# Patient Record
Sex: FEMALE | Race: WHITE | HISPANIC OR LATINO
[De-identification: names, ages, dates, MRNs, and addresses within clinical notes are randomized per-mention and may not be internally consistent; named-entity substitution may affect disease eponyms.]

---

## 2017-11-27 ENCOUNTER — RECORD ABSTRACTING (OUTPATIENT)
Age: 42
End: 2017-11-27

## 2017-11-27 DIAGNOSIS — Z92.89 PERSONAL HISTORY OF OTHER MEDICAL TREATMENT: ICD-10-CM

## 2017-11-27 PROBLEM — Z00.00 ENCOUNTER FOR PREVENTIVE HEALTH EXAMINATION: Status: ACTIVE | Noted: 2017-11-27

## 2017-11-29 ENCOUNTER — APPOINTMENT (OUTPATIENT)
Dept: OBGYN | Facility: CLINIC | Age: 42
End: 2017-11-29

## 2017-12-05 ENCOUNTER — APPOINTMENT (OUTPATIENT)
Dept: OBGYN | Facility: CLINIC | Age: 42
End: 2017-12-05

## 2018-02-15 ENCOUNTER — APPOINTMENT (OUTPATIENT)
Dept: OBGYN | Facility: CLINIC | Age: 43
End: 2018-02-15
Payer: MEDICAID

## 2018-02-15 VITALS
HEART RATE: 101 BPM | DIASTOLIC BLOOD PRESSURE: 94 MMHG | WEIGHT: 146 LBS | BODY MASS INDEX: 23.46 KG/M2 | HEIGHT: 66 IN | SYSTOLIC BLOOD PRESSURE: 132 MMHG

## 2018-02-15 DIAGNOSIS — Z78.9 OTHER SPECIFIED HEALTH STATUS: ICD-10-CM

## 2018-02-15 PROCEDURE — 99213 OFFICE O/P EST LOW 20 MIN: CPT | Mod: 25

## 2018-02-15 PROCEDURE — 99396 PREV VISIT EST AGE 40-64: CPT

## 2018-02-16 LAB
C TRACH RRNA SPEC QL NAA+PROBE: NOT DETECTED
HPV HIGH+LOW RISK DNA PNL CVX: NOT DETECTED
N GONORRHOEA RRNA SPEC QL NAA+PROBE: NOT DETECTED
SOURCE TP AMPLIFICATION: NORMAL

## 2018-02-22 LAB — CYTOLOGY CVX/VAG DOC THIN PREP: NORMAL

## 2019-01-04 ENCOUNTER — APPOINTMENT (OUTPATIENT)
Dept: OBGYN | Facility: CLINIC | Age: 44
End: 2019-01-04
Payer: COMMERCIAL

## 2019-01-04 VITALS
BODY MASS INDEX: 24.27 KG/M2 | HEART RATE: 75 BPM | HEIGHT: 66 IN | WEIGHT: 151 LBS | DIASTOLIC BLOOD PRESSURE: 91 MMHG | SYSTOLIC BLOOD PRESSURE: 137 MMHG

## 2019-01-04 DIAGNOSIS — N89.8 OTHER SPECIFIED NONINFLAMMATORY DISORDERS OF VAGINA: ICD-10-CM

## 2019-01-04 PROCEDURE — 99213 OFFICE O/P EST LOW 20 MIN: CPT

## 2019-01-10 LAB — CYTOLOGY CVX/VAG DOC THIN PREP: NORMAL

## 2020-10-26 ENCOUNTER — APPOINTMENT (OUTPATIENT)
Dept: OBGYN | Facility: CLINIC | Age: 45
End: 2020-10-26
Payer: COMMERCIAL

## 2020-10-26 VITALS
HEART RATE: 84 BPM | HEIGHT: 66 IN | DIASTOLIC BLOOD PRESSURE: 110 MMHG | BODY MASS INDEX: 24.16 KG/M2 | SYSTOLIC BLOOD PRESSURE: 164 MMHG | WEIGHT: 150.31 LBS

## 2020-10-26 DIAGNOSIS — Z01.419 ENCOUNTER FOR GYNECOLOGICAL EXAMINATION (GENERAL) (ROUTINE) W/OUT ABNORMAL FINDINGS: ICD-10-CM

## 2020-10-26 DIAGNOSIS — Z12.39 ENCOUNTER FOR OTHER SCREENING FOR MALIGNANT NEOPLASM OF BREAST: ICD-10-CM

## 2020-10-26 PROCEDURE — 99396 PREV VISIT EST AGE 40-64: CPT

## 2020-10-26 PROCEDURE — 99072 ADDL SUPL MATRL&STAF TM PHE: CPT

## 2020-10-29 LAB — CYTOLOGY CVX/VAG DOC THIN PREP: NORMAL

## 2020-12-23 PROBLEM — Z01.419 ENCOUNTER FOR GYNECOLOGICAL EXAMINATION: Status: RESOLVED | Noted: 2020-10-26 | Resolved: 2020-12-23

## 2021-05-21 ENCOUNTER — OUTPATIENT (OUTPATIENT)
Dept: OUTPATIENT SERVICES | Facility: HOSPITAL | Age: 46
LOS: 1 days | Discharge: HOME | End: 2021-05-21

## 2021-05-21 VITALS
RESPIRATION RATE: 16 BRPM | HEIGHT: 66 IN | DIASTOLIC BLOOD PRESSURE: 99 MMHG | HEART RATE: 78 BPM | WEIGHT: 150.8 LBS | SYSTOLIC BLOOD PRESSURE: 150 MMHG | TEMPERATURE: 98 F | OXYGEN SATURATION: 99 %

## 2021-05-21 DIAGNOSIS — Z98.51 TUBAL LIGATION STATUS: Chronic | ICD-10-CM

## 2021-05-21 DIAGNOSIS — Z01.818 ENCOUNTER FOR OTHER PREPROCEDURAL EXAMINATION: ICD-10-CM

## 2021-05-21 DIAGNOSIS — Z01.812 ENCOUNTER FOR PREPROCEDURAL LABORATORY EXAMINATION: ICD-10-CM

## 2021-05-21 DIAGNOSIS — G56.01 CARPAL TUNNEL SYNDROME, RIGHT UPPER LIMB: ICD-10-CM

## 2021-05-21 LAB
ANION GAP SERPL CALC-SCNC: 5 MMOL/L — SIGNIFICANT CHANGE UP (ref 5–17)
BASOPHILS # BLD AUTO: 0.04 K/UL — SIGNIFICANT CHANGE UP (ref 0–0.2)
BASOPHILS NFR BLD AUTO: 0.3 % — SIGNIFICANT CHANGE UP (ref 0–2)
BUN SERPL-MCNC: 13 MG/DL — SIGNIFICANT CHANGE UP (ref 7–23)
CALCIUM SERPL-MCNC: 8.9 MG/DL — SIGNIFICANT CHANGE UP (ref 8.5–10.1)
CHLORIDE SERPL-SCNC: 103 MMOL/L — SIGNIFICANT CHANGE UP (ref 96–108)
CO2 SERPL-SCNC: 31 MMOL/L — SIGNIFICANT CHANGE UP (ref 22–31)
CREAT SERPL-MCNC: 0.67 MG/DL — SIGNIFICANT CHANGE UP (ref 0.5–1.3)
EOSINOPHIL # BLD AUTO: 0 K/UL — SIGNIFICANT CHANGE UP (ref 0–0.5)
EOSINOPHIL NFR BLD AUTO: 0 % — SIGNIFICANT CHANGE UP (ref 0–6)
GLUCOSE SERPL-MCNC: 94 MG/DL — SIGNIFICANT CHANGE UP (ref 70–99)
HCG UR QL: NEGATIVE — SIGNIFICANT CHANGE UP
HCT VFR BLD CALC: 38.2 % — SIGNIFICANT CHANGE UP (ref 34.5–45)
HGB BLD-MCNC: 12 G/DL — SIGNIFICANT CHANGE UP (ref 11.5–15.5)
IMM GRANULOCYTES NFR BLD AUTO: 1.8 % — HIGH (ref 0–1.5)
LYMPHOCYTES # BLD AUTO: 17.9 % — SIGNIFICANT CHANGE UP (ref 13–44)
LYMPHOCYTES # BLD AUTO: 2.58 K/UL — SIGNIFICANT CHANGE UP (ref 1–3.3)
MCHC RBC-ENTMCNC: 23.7 PG — LOW (ref 27–34)
MCHC RBC-ENTMCNC: 31.4 GM/DL — LOW (ref 32–36)
MCV RBC AUTO: 75.5 FL — LOW (ref 80–100)
MONOCYTES # BLD AUTO: 0.92 K/UL — HIGH (ref 0–0.9)
MONOCYTES NFR BLD AUTO: 6.4 % — SIGNIFICANT CHANGE UP (ref 2–14)
NEUTROPHILS # BLD AUTO: 10.65 K/UL — HIGH (ref 1.8–7.4)
NEUTROPHILS NFR BLD AUTO: 73.6 % — SIGNIFICANT CHANGE UP (ref 43–77)
NRBC # BLD: 0 /100 WBCS — SIGNIFICANT CHANGE UP (ref 0–0)
PLATELET # BLD AUTO: 531 K/UL — HIGH (ref 150–400)
POTASSIUM SERPL-MCNC: 3.8 MMOL/L — SIGNIFICANT CHANGE UP (ref 3.5–5.3)
POTASSIUM SERPL-SCNC: 3.8 MMOL/L — SIGNIFICANT CHANGE UP (ref 3.5–5.3)
RBC # BLD: 5.06 M/UL — SIGNIFICANT CHANGE UP (ref 3.8–5.2)
RBC # FLD: 15.3 % — HIGH (ref 10.3–14.5)
SODIUM SERPL-SCNC: 139 MMOL/L — SIGNIFICANT CHANGE UP (ref 135–145)
WBC # BLD: 14.45 K/UL — HIGH (ref 3.8–10.5)
WBC # FLD AUTO: 14.45 K/UL — HIGH (ref 3.8–10.5)

## 2021-05-21 NOTE — H&P PST ADULT - NSANTHOSAYNRD_GEN_A_CORE
No. MIR screening performed.  STOP BANG Legend: 0-2 = LOW Risk; 3-4 = INTERMEDIATE Risk; 5-8 = HIGH Risk

## 2021-05-21 NOTE — H&P PST ADULT - HISTORY OF PRESENT ILLNESS
44 yo  female c/o right carpal tunnel syndrome - schedule for right carpal tunnel release     spoke in patient's  native language     denies recent travels in the past 30 days. No fever, SOB, cough, flu like symptoms or body rash- covid screen

## 2021-05-21 NOTE — H&P PST ADULT - NSICDXPROBLEM_GEN_ALL_CORE_FT
PROBLEM DIAGNOSES  Problem: Right carpal tunnel syndrome  Assessment and Plan: scheduled for right carpla tunnel release    Problem: Preoperative examination  Assessment and Plan: Preop instructions provided including NPO status. Hibiclens wash for infection control. Patient aware to stop NSAID, OTC herbals  for 7-10 days, needs to be accoumpanied by adult upon discharge.  Patient verbalized understanding  patient instructed on having covid19 swab 3 days prior to surgery

## 2021-05-21 NOTE — H&P PST ADULT - ASSESSMENT
right  carpal tunnel  CAPRINI SCORE    AGE RELATED RISK FACTORS                                                       MOBILITY RELATED FACTORS  [ ] Age 41-60 years                                            (1 Point)                  [ ] Bed rest                                                        (1 Point)  [ ] Age: 61-74 years                                           (2 Points)                [ ] Plaster cast                                                   (2 Points)  [ ] Age= 75 years                                              (3 Points)                 [ ] Bed bound for more than 72 hours                   (2 Points)    DISEASE RELATED RISK FACTORS                                               GENDER SPECIFIC FACTORS  [ ] Edema in the lower extremities                       (1 Point)                  [ ] Pregnancy                                                     (1 Point)  [ ] Varicose veins                                               (1 Point)                  [ ] Post-partum < 6 weeks                                   (1 Point)             [ ] BMI > 25 Kg/m2                                            (1 Point)                  [ ] Hormonal therapy  or oral contraception            (1 Point)                 [ ] Sepsis (in the previous month)                        (1 Point)                  [ ] History of pregnancy complications  [ ] Pneumonia or serious lung disease                                               [ ] Unexplained or recurrent                       (1 Point)           (in the previous month)                               (1 Point)  [ ] Abnormal pulmonary function test                     (1 Point)                 SURGERY RELATED RISK FACTORS  [ ] Acute myocardial infarction                              (1 Point)                 [ ]  Section                                            (1 Point)  [ ] Congestive heart failure (in the previous month)  (1 Point)                 [ ] Minor surgery                                                 (1 Point)   [ ] Inflammatory bowel disease                             (1 Point)                 [ ] Arthroscopic surgery                                        (2 Points)  [ ] Central venous access                                    (2 Points)                [ ] General surgery lasting more than 45 minutes   (2 Points)       [ ] Stroke (in the previous month)                          (5 Points)               [ ] Elective arthroplasty                                        (5 Points)                                                                                                                                               HEMATOLOGY RELATED FACTORS                                                 TRAUMA RELATED RISK FACTORS  [ ] Prior episodes of VTE                                     (3 Points)                 [ ] Fracture of the hip, pelvis, or leg                       (5 Points)  [ ] Positive family history for VTE                         (3 Points)                 [ ] Acute spinal cord injury (in the previous month)  (5 Points)  [ ] Prothrombin 32668 A                                      (3 Points)                 [ ] Paralysis  (less than 1 month)                          (5 Points)  [ ] Factor V Leiden                                             (3 Points)                 [ ] Multiple Trauma within 1 month                         (5 Points)  [ ] Lupus anticoagulants                                     (3 Points)                                                           [ ] Anticardiolipin antibodies                                (3 Points)                                                       [ ] High homocysteine in the blood                      (3 Points)                                             [ ] Other congenital or acquired thrombophilia       (3 Points)                                                [ ] Heparin induced thrombocytopenia                  (3 Points)                                          Total Score [         4)  Caprini Score 0-2: Low risk, No VTE Prophylaxis required for most patient's, encourage ambulation  Caprini Score 3-6: At Risk, Pharmacologic VTE prophylaxis is indicated for most patients ( in the absence of a contraindication)  Caprini Score Greater than or = 7: High Risk , pharmacologic VTE is indicated for most patients ( in the absence of a contraindication)    Caprini score indicates that the patient is high risk for VTE event ( score 6 or greater). Surgical patient's in this group will benefit from both pharmacologic prophylaxis and intermittent compression devices . Surgical team will determine the balance between VTE  risk and bleeding risk and other clinical considerations

## 2021-05-26 DIAGNOSIS — Z01.818 ENCOUNTER FOR OTHER PREPROCEDURAL EXAMINATION: ICD-10-CM

## 2021-06-01 ENCOUNTER — APPOINTMENT (OUTPATIENT)
Dept: DISASTER EMERGENCY | Facility: CLINIC | Age: 46
End: 2021-06-01

## 2021-06-02 LAB — SARS-COV-2 N GENE NPH QL NAA+PROBE: NOT DETECTED

## 2021-06-03 ENCOUNTER — TRANSCRIPTION ENCOUNTER (OUTPATIENT)
Age: 46
End: 2021-06-03

## 2021-06-03 PROBLEM — Z78.9 OTHER SPECIFIED HEALTH STATUS: Chronic | Status: ACTIVE | Noted: 2021-05-21

## 2021-06-03 LAB
HCT VFR BLD CALC: 36.7 % — SIGNIFICANT CHANGE UP (ref 34.5–45)
HGB BLD-MCNC: 11.4 G/DL — LOW (ref 11.5–15.5)
MCHC RBC-ENTMCNC: 23.8 PG — LOW (ref 27–34)
MCHC RBC-ENTMCNC: 31.1 GM/DL — LOW (ref 32–36)
MCV RBC AUTO: 76.8 FL — LOW (ref 80–100)
NRBC # BLD: 0 /100 WBCS — SIGNIFICANT CHANGE UP (ref 0–0)
PLATELET # BLD AUTO: 353 K/UL — SIGNIFICANT CHANGE UP (ref 150–400)
RBC # BLD: 4.78 M/UL — SIGNIFICANT CHANGE UP (ref 3.8–5.2)
RBC # FLD: 15.5 % — HIGH (ref 10.3–14.5)
WBC # BLD: 10.63 K/UL — HIGH (ref 3.8–10.5)
WBC # FLD AUTO: 10.63 K/UL — HIGH (ref 3.8–10.5)

## 2021-06-04 ENCOUNTER — OUTPATIENT (OUTPATIENT)
Dept: OUTPATIENT SERVICES | Facility: HOSPITAL | Age: 46
LOS: 1 days | Discharge: HOME | End: 2021-06-04

## 2021-06-04 VITALS
SYSTOLIC BLOOD PRESSURE: 112 MMHG | RESPIRATION RATE: 17 BRPM | DIASTOLIC BLOOD PRESSURE: 71 MMHG | HEART RATE: 58 BPM | OXYGEN SATURATION: 100 % | TEMPERATURE: 98 F

## 2021-06-04 VITALS
TEMPERATURE: 99 F | SYSTOLIC BLOOD PRESSURE: 141 MMHG | HEART RATE: 84 BPM | RESPIRATION RATE: 18 BRPM | DIASTOLIC BLOOD PRESSURE: 88 MMHG | HEIGHT: 66 IN | WEIGHT: 149.91 LBS

## 2021-06-04 DIAGNOSIS — G56.01 CARPAL TUNNEL SYNDROME, RIGHT UPPER LIMB: ICD-10-CM

## 2021-06-04 DIAGNOSIS — Z98.51 TUBAL LIGATION STATUS: Chronic | ICD-10-CM

## 2021-06-04 RX ORDER — KETOROLAC TROMETHAMINE 30 MG/ML
30 SYRINGE (ML) INJECTION ONCE
Refills: 0 | Status: DISCONTINUED | OUTPATIENT
Start: 2021-06-04 | End: 2021-06-04

## 2021-06-04 RX ORDER — ACETAMINOPHEN 500 MG
1000 TABLET ORAL ONCE
Refills: 0 | Status: COMPLETED | OUTPATIENT
Start: 2021-06-04 | End: 2021-06-04

## 2021-06-04 RX ORDER — ONDANSETRON 8 MG/1
4 TABLET, FILM COATED ORAL ONCE
Refills: 0 | Status: COMPLETED | OUTPATIENT
Start: 2021-06-04 | End: 2021-06-04

## 2021-06-04 RX ORDER — SODIUM CHLORIDE 9 MG/ML
1000 INJECTION, SOLUTION INTRAVENOUS
Refills: 0 | Status: DISCONTINUED | OUTPATIENT
Start: 2021-06-04 | End: 2021-06-04

## 2021-06-04 RX ORDER — METOCLOPRAMIDE HCL 10 MG
10 TABLET ORAL ONCE
Refills: 0 | Status: COMPLETED | OUTPATIENT
Start: 2021-06-04 | End: 2021-06-04

## 2021-06-04 RX ADMIN — Medication 30 MILLIGRAM(S): at 10:49

## 2021-06-04 RX ADMIN — Medication 10 MILLIGRAM(S): at 12:17

## 2021-06-04 RX ADMIN — SODIUM CHLORIDE 110 MILLILITER(S): 9 INJECTION, SOLUTION INTRAVENOUS at 10:17

## 2021-06-04 RX ADMIN — ONDANSETRON 4 MILLIGRAM(S): 8 TABLET, FILM COATED ORAL at 11:03

## 2021-06-04 RX ADMIN — Medication 30 MILLIGRAM(S): at 10:34

## 2021-06-04 RX ADMIN — Medication 400 MILLIGRAM(S): at 10:34

## 2021-06-04 RX ADMIN — Medication 1000 MILLIGRAM(S): at 10:49

## 2021-06-04 NOTE — ASU DISCHARGE PLAN (ADULT/PEDIATRIC) - CARE PROVIDER_API CALL
Varsha Menchaca)  [facility]  [address] [place] [id number]  Phone: [phone]  Fax: [phone]  Follow Up Time:

## 2021-06-11 DIAGNOSIS — R73.03 PREDIABETES: ICD-10-CM

## 2021-06-11 DIAGNOSIS — G56.01 CARPAL TUNNEL SYNDROME, RIGHT UPPER LIMB: ICD-10-CM

## 2021-06-11 DIAGNOSIS — E78.5 HYPERLIPIDEMIA, UNSPECIFIED: ICD-10-CM

## 2021-06-11 DIAGNOSIS — E55.9 VITAMIN D DEFICIENCY, UNSPECIFIED: ICD-10-CM

## 2021-10-28 ENCOUNTER — APPOINTMENT (OUTPATIENT)
Dept: OBGYN | Facility: CLINIC | Age: 46
End: 2021-10-28
Payer: MEDICAID

## 2021-10-28 VITALS
BODY MASS INDEX: 24.75 KG/M2 | HEIGHT: 66 IN | WEIGHT: 154 LBS | HEART RATE: 91 BPM | SYSTOLIC BLOOD PRESSURE: 138 MMHG | DIASTOLIC BLOOD PRESSURE: 93 MMHG

## 2021-10-28 PROCEDURE — 99072 ADDL SUPL MATRL&STAF TM PHE: CPT

## 2021-10-28 PROCEDURE — 99396 PREV VISIT EST AGE 40-64: CPT

## 2021-10-28 NOTE — COUNSELING
[Nutrition/ Exercise/ Weight Management] : nutrition, exercise, weight management [Body Image] : body image [Vitamins/Supplements] : vitamins/supplements [Sunscreen] : sunscreen [Drugs/Alcohol] : drugs, alcohol [Breast Self Exam] : breast self exam [Bladder Hygiene] : bladder hygiene [Sexual Abuse] : sexual abuse [Confidentiality] : confidentiality [STD (testing, results, tx)] : STD (testing, results, tx) [Vaccines] : vaccines

## 2021-10-28 NOTE — HISTORY OF PRESENT ILLNESS
[Y] : Patient is sexually active [N] : Patient denies prior pregnancies [Menarche Age: ____] : age at menarche was [unfilled] [PapSmeardate] : 10/20

## 2021-11-02 DIAGNOSIS — R92.1 MAMMOGRAPHIC CALCIFICATION FOUND ON DIAGNOSTIC IMAGING OF BREAST: ICD-10-CM

## 2021-11-03 LAB — CYTOLOGY CVX/VAG DOC THIN PREP: NORMAL

## 2021-11-06 ENCOUNTER — OUTPATIENT (OUTPATIENT)
Dept: OUTPATIENT SERVICES | Facility: HOSPITAL | Age: 46
LOS: 1 days | End: 2021-11-06

## 2021-11-06 DIAGNOSIS — Z00.8 ENCOUNTER FOR OTHER GENERAL EXAMINATION: ICD-10-CM

## 2021-11-06 DIAGNOSIS — Z98.51 TUBAL LIGATION STATUS: Chronic | ICD-10-CM

## 2021-11-27 ENCOUNTER — APPOINTMENT (OUTPATIENT)
Dept: ULTRASOUND IMAGING | Facility: CLINIC | Age: 46
End: 2021-11-27

## 2021-11-27 ENCOUNTER — RESULT REVIEW (OUTPATIENT)
Age: 46
End: 2021-11-27

## 2021-11-27 ENCOUNTER — OUTPATIENT (OUTPATIENT)
Dept: OUTPATIENT SERVICES | Facility: HOSPITAL | Age: 46
LOS: 1 days | End: 2021-11-27
Payer: COMMERCIAL

## 2021-11-27 DIAGNOSIS — Z98.51 TUBAL LIGATION STATUS: Chronic | ICD-10-CM

## 2021-11-27 DIAGNOSIS — R10.2 PELVIC AND PERINEAL PAIN: ICD-10-CM

## 2021-11-27 PROCEDURE — 76830 TRANSVAGINAL US NON-OB: CPT

## 2021-11-27 PROCEDURE — 76856 US EXAM PELVIC COMPLETE: CPT | Mod: 26

## 2021-11-27 PROCEDURE — 76830 TRANSVAGINAL US NON-OB: CPT | Mod: 26

## 2021-11-27 PROCEDURE — 76856 US EXAM PELVIC COMPLETE: CPT

## 2022-10-11 ENCOUNTER — APPOINTMENT (OUTPATIENT)
Dept: OBGYN | Facility: CLINIC | Age: 47
End: 2022-10-11

## 2022-10-11 ENCOUNTER — TRANSCRIPTION ENCOUNTER (OUTPATIENT)
Age: 47
End: 2022-10-11

## 2022-10-11 VITALS
HEIGHT: 66 IN | SYSTOLIC BLOOD PRESSURE: 141 MMHG | WEIGHT: 155.38 LBS | DIASTOLIC BLOOD PRESSURE: 90 MMHG | BODY MASS INDEX: 24.97 KG/M2

## 2022-10-11 PROCEDURE — 99214 OFFICE O/P EST MOD 30 MIN: CPT

## 2022-10-11 NOTE — REVIEW OF SYSTEMS
[Pelvic Pain] : pelvic pain [Abn Vag Bleeding] : abnormal vaginal bleeding [Nl] : Hematologic/Lymphatic [FreeTextEntry1] : vaginal dryness

## 2022-11-02 ENCOUNTER — RESULT CHARGE (OUTPATIENT)
Age: 47
End: 2022-11-02

## 2022-11-02 ENCOUNTER — APPOINTMENT (OUTPATIENT)
Dept: OBGYN | Facility: CLINIC | Age: 47
End: 2022-11-02

## 2022-11-02 VITALS — DIASTOLIC BLOOD PRESSURE: 104 MMHG | SYSTOLIC BLOOD PRESSURE: 162 MMHG | HEIGHT: 66 IN

## 2022-11-02 LAB
HCG UR QL: NEGATIVE
QUALITY CONTROL: YES

## 2022-11-02 PROCEDURE — 58558Z: CUSTOM

## 2022-11-02 PROCEDURE — 81025 URINE PREGNANCY TEST: CPT

## 2022-11-02 NOTE — PROCEDURE
[Hysteroscopy] : Hysteroscopy [Time out performed] : Pre-procedure time out performed.  Patient's name, date of birth and procedure confirmed. [Consent Obtained] : Consent obtained [Risks] : risks [Abnormal uterine bleeding] : abnormal uterine bleeding [Benefits] : benefits [Alternatives] : alternatives [Patient] : patient [Infection] : infection [Bleeding] : bleeding [Allergic Reaction] : allergic reaction [Lidocaine___ mL] : [unfilled] ~UmL of lidocaine [flexible] : Using aseptic technique a hysteroscopy was performed using a flexible hysteroscope [Sent to Pathology] : specimen was placed in buffered formalin and sent for pathology [Antibiotics given] : antibiotics not given [Hemostasis obtained] : hemostasis obtained [Tolerated Well] : Patient tolerated the procedure well [Aftercare instructions/regstrictions given and follow-up scheduled] : Aftercare instructions/restrictions given and follow-up scheduled [de-identified] : Under sterile condition and with paracervical block the cervix was dilated and endometrial sampling obtained and sent to pathology.  Hysteroscopic evaluation shows lush endometrium with normal contour.  No polyps or myomas noted.  Patient tolerated the procedure well.  Return in 4 weeks for follow-up.  Treatment options discussed and patient prefers endometrial ablation which was done in the future.

## 2022-11-02 NOTE — PROCEDURE
[Hysteroscopy] : Hysteroscopy [Time out performed] : Pre-procedure time out performed.  Patient's name, date of birth and procedure confirmed. [Consent Obtained] : Consent obtained [Abnormal uterine bleeding] : abnormal uterine bleeding [Risks] : risks [Benefits] : benefits [Alternatives] : alternatives [Patient] : patient [Infection] : infection [Bleeding] : bleeding [Allergic Reaction] : allergic reaction [Lidocaine___ mL] : [unfilled] ~UmL of lidocaine [flexible] : Using aseptic technique a hysteroscopy was performed using a flexible hysteroscope [Sent to Pathology] : specimen was placed in buffered formalin and sent for pathology [Antibiotics given] : antibiotics not given [Hemostasis obtained] : hemostasis obtained [Tolerated Well] : Patient tolerated the procedure well [Aftercare instructions/regstrictions given and follow-up scheduled] : Aftercare instructions/restrictions given and follow-up scheduled [de-identified] : Under sterile condition and with paracervical block the cervix was dilated and endometrial sampling obtained and sent to pathology.  Hysteroscopic evaluation shows lush endometrium with normal contour.  No polyps or myomas noted.  Patient tolerated the procedure well.  Return in 4 weeks for follow-up.  Treatment options discussed and patient prefers endometrial ablation which was done in the future.

## 2022-11-07 LAB — CORE LAB BIOPSY: NORMAL

## 2022-12-02 ENCOUNTER — APPOINTMENT (OUTPATIENT)
Dept: OBGYN | Facility: CLINIC | Age: 47
End: 2022-12-02

## 2022-12-02 VITALS
BODY MASS INDEX: 24.75 KG/M2 | SYSTOLIC BLOOD PRESSURE: 135 MMHG | HEIGHT: 66 IN | WEIGHT: 154 LBS | DIASTOLIC BLOOD PRESSURE: 89 MMHG

## 2022-12-02 PROCEDURE — 99214 OFFICE O/P EST MOD 30 MIN: CPT

## 2022-12-02 RX ORDER — METHOTREXATE 25 MG/.5ML
25 INJECTION, SOLUTION SUBCUTANEOUS
Qty: 2 | Refills: 0 | Status: COMPLETED | COMMUNITY
Start: 2022-07-22

## 2022-12-02 RX ORDER — IBUPROFEN 800 MG/1
800 TABLET, FILM COATED ORAL
Qty: 21 | Refills: 0 | Status: ACTIVE | COMMUNITY
Start: 2022-12-02 | End: 1900-01-01

## 2023-01-06 ENCOUNTER — RESULT CHARGE (OUTPATIENT)
Age: 48
End: 2023-01-06

## 2023-01-06 ENCOUNTER — APPOINTMENT (OUTPATIENT)
Dept: OBGYN | Facility: CLINIC | Age: 48
End: 2023-01-06
Payer: MEDICAID

## 2023-01-06 VITALS — DIASTOLIC BLOOD PRESSURE: 97 MMHG | SYSTOLIC BLOOD PRESSURE: 143 MMHG | BODY MASS INDEX: 24.86 KG/M2 | WEIGHT: 154 LBS

## 2023-01-06 DIAGNOSIS — N92.1 EXCESSIVE AND FREQUENT MENSTRUATION WITH IRREGULAR CYCLE: ICD-10-CM

## 2023-01-06 LAB
HCG UR QL: NEGATIVE
QUALITY CONTROL: YES

## 2023-01-06 PROCEDURE — 81025 URINE PREGNANCY TEST: CPT

## 2023-01-06 PROCEDURE — 58563Z: CUSTOM

## 2023-01-06 RX ORDER — KETOROLAC TROMETHAMINE 60 MG/2ML
60 INJECTION, SOLUTION INTRAMUSCULAR
Qty: 1 | Refills: 0 | Status: COMPLETED | OUTPATIENT
Start: 2023-01-06

## 2023-01-06 RX ADMIN — KETOROLAC TROMETHAMINE 0 MG/2ML: 60 INJECTION, SOLUTION INTRAMUSCULAR at 00:00

## 2023-01-06 NOTE — PROCEDURE
[Hysteroscopy] : Hysteroscopy [Time out performed] : Pre-procedure time out performed.  Patient's name, date of birth and procedure confirmed. [Consent Obtained] : Consent obtained [Abnormal uterine bleeding] : abnormal uterine bleeding [Risks] : risks [Benefits] : benefits [Alternatives] : alternatives [Patient] : patient [Infection] : infection [Bleeding] : bleeding [Allergic Reaction] : allergic reaction [Lidocaine___ mL] : [unfilled] ~UmL of lidocaine [flexible] : Using aseptic technique a hysteroscopy was performed using a flexible hysteroscope [Antibiotics given] : antibiotics not given [Hemostasis obtained] : hemostasis obtained [Tolerated Well] : Patient tolerated the procedure well [Aftercare instructions/regstrictions given and follow-up scheduled] : Aftercare instructions/restrictions given and follow-up scheduled [de-identified] : 47-year-old patient with severe menorrhagia presents for hysteroscopic endometrial ablation.  Hysteroscopy reveals normal endometrial contour with lush features.  Endometrial apparatus, M GREYSON was primed and inserted into the endometrium, the endometrium was ablated for the prescribed 2 minutes.  Postprocedure hysteroscopy revealed the endometrial cavity to have been properly and thoroughly ablated.  Patient tolerated the procedure well.  Return in 6 weeks for follow-up.

## 2023-01-22 ENCOUNTER — INPATIENT (INPATIENT)
Facility: HOSPITAL | Age: 48
LOS: 2 days | Discharge: HOME | DRG: 759 | End: 2023-01-25
Attending: OBSTETRICS & GYNECOLOGY | Admitting: OBSTETRICS & GYNECOLOGY
Payer: COMMERCIAL

## 2023-01-22 VITALS
OXYGEN SATURATION: 100 % | TEMPERATURE: 97 F | WEIGHT: 149.91 LBS | RESPIRATION RATE: 18 BRPM | HEART RATE: 100 BPM | HEIGHT: 66 IN | DIASTOLIC BLOOD PRESSURE: 75 MMHG | SYSTOLIC BLOOD PRESSURE: 128 MMHG

## 2023-01-22 DIAGNOSIS — N70.11 CHRONIC SALPINGITIS: ICD-10-CM

## 2023-01-22 DIAGNOSIS — Z98.51 TUBAL LIGATION STATUS: Chronic | ICD-10-CM

## 2023-01-22 LAB
ALBUMIN SERPL ELPH-MCNC: 3.8 G/DL — SIGNIFICANT CHANGE UP (ref 3.3–5.2)
ALP SERPL-CCNC: 129 U/L — HIGH (ref 40–120)
ALT FLD-CCNC: 14 U/L — SIGNIFICANT CHANGE UP
ANION GAP SERPL CALC-SCNC: 11 MMOL/L — SIGNIFICANT CHANGE UP (ref 5–17)
APPEARANCE UR: ABNORMAL
AST SERPL-CCNC: 16 U/L — SIGNIFICANT CHANGE UP
BACTERIA # UR AUTO: ABNORMAL
BASOPHILS # BLD AUTO: 0.07 K/UL — SIGNIFICANT CHANGE UP (ref 0–0.2)
BASOPHILS NFR BLD AUTO: 0.3 % — SIGNIFICANT CHANGE UP (ref 0–2)
BILIRUB SERPL-MCNC: 0.4 MG/DL — SIGNIFICANT CHANGE UP (ref 0.4–2)
BILIRUB UR-MCNC: NEGATIVE — SIGNIFICANT CHANGE UP
BLD GP AB SCN SERPL QL: SIGNIFICANT CHANGE UP
BUN SERPL-MCNC: 8.3 MG/DL — SIGNIFICANT CHANGE UP (ref 8–20)
CALCIUM SERPL-MCNC: 8.5 MG/DL — SIGNIFICANT CHANGE UP (ref 8.4–10.5)
CHLORIDE SERPL-SCNC: 101 MMOL/L — SIGNIFICANT CHANGE UP (ref 96–108)
CO2 SERPL-SCNC: 22 MMOL/L — SIGNIFICANT CHANGE UP (ref 22–29)
COLOR SPEC: YELLOW — SIGNIFICANT CHANGE UP
COMMENT - URINE: SIGNIFICANT CHANGE UP
CREAT SERPL-MCNC: 0.6 MG/DL — SIGNIFICANT CHANGE UP (ref 0.5–1.3)
DIFF PNL FLD: NEGATIVE — SIGNIFICANT CHANGE UP
EGFR: 112 ML/MIN/1.73M2 — SIGNIFICANT CHANGE UP
EOSINOPHIL # BLD AUTO: 0.03 K/UL — SIGNIFICANT CHANGE UP (ref 0–0.5)
EOSINOPHIL NFR BLD AUTO: 0.1 % — SIGNIFICANT CHANGE UP (ref 0–6)
EPI CELLS # UR: SIGNIFICANT CHANGE UP
FLUAV AG NPH QL: SIGNIFICANT CHANGE UP
FLUBV AG NPH QL: SIGNIFICANT CHANGE UP
GLUCOSE SERPL-MCNC: 132 MG/DL — HIGH (ref 70–99)
GLUCOSE UR QL: NEGATIVE MG/DL — SIGNIFICANT CHANGE UP
HCG SERPL-ACNC: <4 MIU/ML — SIGNIFICANT CHANGE UP
HCT VFR BLD CALC: 37.3 % — SIGNIFICANT CHANGE UP (ref 34.5–45)
HGB BLD-MCNC: 11.9 G/DL — SIGNIFICANT CHANGE UP (ref 11.5–15.5)
IMM GRANULOCYTES NFR BLD AUTO: 1.2 % — HIGH (ref 0–0.9)
KETONES UR-MCNC: ABNORMAL
LACTATE SERPL-SCNC: 1.3 MMOL/L — SIGNIFICANT CHANGE UP (ref 0.5–2)
LEUKOCYTE ESTERASE UR-ACNC: ABNORMAL
LIDOCAIN IGE QN: 25 U/L — SIGNIFICANT CHANGE UP (ref 22–51)
LYMPHOCYTES # BLD AUTO: 1.57 K/UL — SIGNIFICANT CHANGE UP (ref 1–3.3)
LYMPHOCYTES # BLD AUTO: 7.1 % — LOW (ref 13–44)
MCHC RBC-ENTMCNC: 24.9 PG — LOW (ref 27–34)
MCHC RBC-ENTMCNC: 31.9 GM/DL — LOW (ref 32–36)
MCV RBC AUTO: 78 FL — LOW (ref 80–100)
MONOCYTES # BLD AUTO: 0.57 K/UL — SIGNIFICANT CHANGE UP (ref 0–0.9)
MONOCYTES NFR BLD AUTO: 2.6 % — SIGNIFICANT CHANGE UP (ref 2–14)
NEUTROPHILS # BLD AUTO: 19.75 K/UL — HIGH (ref 1.8–7.4)
NEUTROPHILS NFR BLD AUTO: 88.7 % — HIGH (ref 43–77)
NITRITE UR-MCNC: NEGATIVE — SIGNIFICANT CHANGE UP
PH UR: 9 — HIGH (ref 5–8)
PLATELET # BLD AUTO: 393 K/UL — SIGNIFICANT CHANGE UP (ref 150–400)
POTASSIUM SERPL-MCNC: 3.6 MMOL/L — SIGNIFICANT CHANGE UP (ref 3.5–5.3)
POTASSIUM SERPL-SCNC: 3.6 MMOL/L — SIGNIFICANT CHANGE UP (ref 3.5–5.3)
PROT SERPL-MCNC: 7.8 G/DL — SIGNIFICANT CHANGE UP (ref 6.6–8.7)
PROT UR-MCNC: 30 MG/DL
RBC # BLD: 4.78 M/UL — SIGNIFICANT CHANGE UP (ref 3.8–5.2)
RBC # FLD: 15 % — HIGH (ref 10.3–14.5)
RBC CASTS # UR COMP ASSIST: SIGNIFICANT CHANGE UP /HPF (ref 0–4)
RSV RNA NPH QL NAA+NON-PROBE: SIGNIFICANT CHANGE UP
SARS-COV-2 RNA SPEC QL NAA+PROBE: SIGNIFICANT CHANGE UP
SODIUM SERPL-SCNC: 134 MMOL/L — LOW (ref 135–145)
SP GR SPEC: 1.01 — SIGNIFICANT CHANGE UP (ref 1.01–1.02)
UROBILINOGEN FLD QL: NEGATIVE MG/DL — SIGNIFICANT CHANGE UP
WBC # BLD: 22.26 K/UL — HIGH (ref 3.8–10.5)
WBC # FLD AUTO: 22.26 K/UL — HIGH (ref 3.8–10.5)
WBC UR QL: SIGNIFICANT CHANGE UP /HPF (ref 0–5)

## 2023-01-22 PROCEDURE — G1004: CPT

## 2023-01-22 PROCEDURE — 74177 CT ABD & PELVIS W/CONTRAST: CPT | Mod: 26,ME

## 2023-01-22 PROCEDURE — 99285 EMERGENCY DEPT VISIT HI MDM: CPT

## 2023-01-22 PROCEDURE — 76856 US EXAM PELVIC COMPLETE: CPT | Mod: 26

## 2023-01-22 PROCEDURE — 76830 TRANSVAGINAL US NON-OB: CPT | Mod: 26

## 2023-01-22 RX ORDER — CEFTRIAXONE 500 MG/1
1000 INJECTION, POWDER, FOR SOLUTION INTRAMUSCULAR; INTRAVENOUS EVERY 24 HOURS
Refills: 0 | Status: COMPLETED | OUTPATIENT
Start: 2023-01-22 | End: 2023-01-25

## 2023-01-22 RX ORDER — METRONIDAZOLE 500 MG
500 TABLET ORAL EVERY 12 HOURS
Refills: 0 | Status: DISCONTINUED | OUTPATIENT
Start: 2023-01-22 | End: 2023-01-25

## 2023-01-22 RX ORDER — CEFTRIAXONE 500 MG/1
1000 INJECTION, POWDER, FOR SOLUTION INTRAMUSCULAR; INTRAVENOUS ONCE
Refills: 0 | Status: COMPLETED | OUTPATIENT
Start: 2023-01-22 | End: 2023-01-22

## 2023-01-22 RX ORDER — SODIUM CHLORIDE 9 MG/ML
1000 INJECTION INTRAMUSCULAR; INTRAVENOUS; SUBCUTANEOUS ONCE
Refills: 0 | Status: COMPLETED | OUTPATIENT
Start: 2023-01-22 | End: 2023-01-22

## 2023-01-22 RX ORDER — METRONIDAZOLE 500 MG
500 TABLET ORAL ONCE
Refills: 0 | Status: COMPLETED | OUTPATIENT
Start: 2023-01-22 | End: 2023-01-22

## 2023-01-22 RX ORDER — IBUPROFEN 200 MG
600 TABLET ORAL EVERY 6 HOURS
Refills: 0 | Status: DISCONTINUED | OUTPATIENT
Start: 2023-01-22 | End: 2023-01-25

## 2023-01-22 RX ORDER — CEFTRIAXONE 500 MG/1
1000 INJECTION, POWDER, FOR SOLUTION INTRAMUSCULAR; INTRAVENOUS ONCE
Refills: 0 | Status: DISCONTINUED | OUTPATIENT
Start: 2023-01-22 | End: 2023-01-22

## 2023-01-22 RX ORDER — ACETAMINOPHEN 500 MG
1000 TABLET ORAL ONCE
Refills: 0 | Status: COMPLETED | OUTPATIENT
Start: 2023-01-22 | End: 2023-01-22

## 2023-01-22 RX ORDER — ONDANSETRON 8 MG/1
4 TABLET, FILM COATED ORAL ONCE
Refills: 0 | Status: COMPLETED | OUTPATIENT
Start: 2023-01-22 | End: 2023-01-22

## 2023-01-22 RX ORDER — MORPHINE SULFATE 50 MG/1
4 CAPSULE, EXTENDED RELEASE ORAL ONCE
Refills: 0 | Status: DISCONTINUED | OUTPATIENT
Start: 2023-01-22 | End: 2023-01-22

## 2023-01-22 RX ORDER — SODIUM CHLORIDE 9 MG/ML
1000 INJECTION INTRAMUSCULAR; INTRAVENOUS; SUBCUTANEOUS
Refills: 0 | Status: DISCONTINUED | OUTPATIENT
Start: 2023-01-22 | End: 2023-01-25

## 2023-01-22 RX ORDER — ONDANSETRON 8 MG/1
4 TABLET, FILM COATED ORAL EVERY 6 HOURS
Refills: 0 | Status: DISCONTINUED | OUTPATIENT
Start: 2023-01-22 | End: 2023-01-25

## 2023-01-22 RX ORDER — ACETAMINOPHEN 500 MG
650 TABLET ORAL EVERY 6 HOURS
Refills: 0 | Status: DISCONTINUED | OUTPATIENT
Start: 2023-01-22 | End: 2023-01-25

## 2023-01-22 RX ORDER — CEFTRIAXONE 500 MG/1
1000 INJECTION, POWDER, FOR SOLUTION INTRAMUSCULAR; INTRAVENOUS EVERY 24 HOURS
Refills: 0 | Status: DISCONTINUED | OUTPATIENT
Start: 2023-01-22 | End: 2023-01-22

## 2023-01-22 RX ADMIN — SODIUM CHLORIDE 1000 MILLILITER(S): 9 INJECTION INTRAMUSCULAR; INTRAVENOUS; SUBCUTANEOUS at 01:56

## 2023-01-22 RX ADMIN — Medication 100 MILLIGRAM(S): at 19:33

## 2023-01-22 RX ADMIN — Medication 100 MILLIGRAM(S): at 06:45

## 2023-01-22 RX ADMIN — SODIUM CHLORIDE 125 MILLILITER(S): 9 INJECTION INTRAMUSCULAR; INTRAVENOUS; SUBCUTANEOUS at 06:45

## 2023-01-22 RX ADMIN — MORPHINE SULFATE 4 MILLIGRAM(S): 50 CAPSULE, EXTENDED RELEASE ORAL at 01:56

## 2023-01-22 RX ADMIN — Medication 400 MILLIGRAM(S): at 07:32

## 2023-01-22 RX ADMIN — Medication 1000 MILLIGRAM(S): at 08:28

## 2023-01-22 RX ADMIN — ONDANSETRON 4 MILLIGRAM(S): 8 TABLET, FILM COATED ORAL at 01:56

## 2023-01-22 RX ADMIN — Medication 100 MILLIGRAM(S): at 07:32

## 2023-01-22 RX ADMIN — Medication 650 MILLIGRAM(S): at 17:25

## 2023-01-22 RX ADMIN — CEFTRIAXONE 1000 MILLIGRAM(S): 500 INJECTION, POWDER, FOR SOLUTION INTRAMUSCULAR; INTRAVENOUS at 06:46

## 2023-01-22 RX ADMIN — Medication 100 MILLIGRAM(S): at 17:26

## 2023-01-22 RX ADMIN — ONDANSETRON 4 MILLIGRAM(S): 8 TABLET, FILM COATED ORAL at 10:55

## 2023-01-22 NOTE — ED ADULT NURSE REASSESSMENT NOTE - NS ED NURSE REASSESS COMMENT FT1
Pt a&ox4, VSS, respirations even and unlabored, denies dizziness, chest pain, or SOB. Pt given PO tylenol, abx, & IVF running, pt awaiting admission, no distress noted.

## 2023-01-22 NOTE — ED ADULT NURSE REASSESSMENT NOTE - NS ED NURSE REASSESS COMMENT FT1
Assumed care from Maura LOWERY at [id number], pt a&ox4, states her pain has improved since coming in, pt has maintenance IVF & antibiotics running, pt pending sonogram results. Pt's respirations even and unlabored, VSS, no distress noted. Pt has call bell within reach and family at the bedside.

## 2023-01-22 NOTE — H&P ADULT - NSHPLABSRESULTS_GEN_ALL_CORE
LABS:                        11.9   . )-----------( 393      ( 2023 01:50 )             37.3         134<L>  |  101  |  8.3  ----------------------------<  132<H>  3.6   |  22.0  |  0.60    Ca    8.5      2023 01:50    TPro  7.8  /  Alb  3.8  /  TBili  0.4  /  DBili  x   /  AST  16  /  ALT  14  /  AlkPhos  129<H>      Urinalysis Basic - ( 2023 01:30 )    Color: Yellow / Appearance: Slightly Turbid / S.015 / pH: x  Gluc: x / Ketone: Trace  / Bili: Negative / Urobili: Negative mg/dL   Blood: x / Protein: 30 mg/dL / Nitrite: Negative   Leuk Esterase: Trace / RBC: 0-2 /HPF / WBC 0-2 /HPF   Sq Epi: x / Non Sq Epi: Occasional / Bacteria: Few    RADIOLOGY STUDIES:  < from: CT Abdomen and Pelvis w/ IV Cont (23 @ 03:39) >  IMPRESSION:  Thick-walled enhancing tubular structure in the right adnexa inseparable   from the ovary with surrounding complex fluid, suspicious for   pyosalpinx/pelvic inflammatory disease. Question short segment enhancing   tube also noted on the left. Correlation with already ordered pelvic   ultrasound is limited.    Mild intrahepatic ductal dilatation and minimal CBD dilatation of   uncertain etiology. Follow-up MRI/MRCP is recommended.    < end of copied text >    < from: US Transvaginal (23 @ 06:33) >  IMPRESSION:    Endometrial air may be compatiblewith recent procedure versus infection.    Large complex right adnexal tubular structure with hyperemia measuring   5.5 x 4.1 x 5.9 cm with adjacent complex fluid. This may reflect   tubo-ovarian abscess or pyosalpinx. Pelvic inflammatory disease canbe   considered.    Left ovary is not visualized due to significant shadowing artifact. A   tubular structure of the left adnexa measures 3.7 x 1.7 x 1.7, possibly   additional hydrosalpinx/pyosalpinx.    Correlation with GYN evaluation and pelvic MRI is recommended. Correlate   for any signs and symptoms of superimposed infection/sepsis.    < end of copied text > LABS:                        11.9   . )-----------( 393      ( 2023 01:50 )             37.3         134<L>  |  101  |  8.3  ----------------------------<  132<H>  3.6   |  22.0  |  0.60    Ca    8.5      2023 01:50    TPro  7.8  /  Alb  3.8  /  TBili  0.4  /  DBili  x   /  AST  16  /  ALT  14  /  AlkPhos  129<H>      Urinalysis Basic - ( 2023 01:30 )    Color: Yellow / Appearance: Slightly Turbid / S.015 / pH: x  Gluc: x / Ketone: Trace  / Bili: Negative / Urobili: Negative mg/dL   Blood: x / Protein: 30 mg/dL / Nitrite: Negative   Leuk Esterase: Trace / RBC: 0-2 /HPF / WBC 0-2 /HPF   Sq Epi: x / Non Sq Epi: Occasional / Bacteria: Few    RADIOLOGY STUDIES:  < from: CT Abdomen and Pelvis w/ IV Cont (23 @ 03:39) >  IMPRESSION:  Thick-walled enhancing tubular structure in the right adnexa inseparable   from the ovary with surrounding complex fluid, suspicious for   pyosalpinx/pelvic inflammatory disease. Question short segment enhancing   tube also noted on the left. Correlation with already ordered pelvic   ultrasound is limited.    Mild intrahepatic ductal dilatation and minimal CBD dilatation of   uncertain etiology. Follow-up MRI/MRCP is recommended.    < end of copied text >    < from: US Transvaginal (23 @ 06:33) >  IMPRESSION:    Endometrial air may be compatiblewith recent procedure versus infection.    Large complex right adnexal tubular structure with hyperemia measuring   5.5 x 4.1 x 5.9 cm with adjacent complex fluid. This may reflect   tubo-ovarian abscess or pyosalpinx. Pelvic inflammatory disease can be   considered.    Left ovary is not visualized due to significant shadowing artifact. A   tubular structure of the left adnexa measures 3.7 x 1.7 x 1.7, possibly   additional hydrosalpinx/pyosalpinx.    Correlation with GYN evaluation and pelvic MRI is recommended. Correlate   for any signs and symptoms of superimposed infection/sepsis.    < end of copied text >

## 2023-01-22 NOTE — ED PROVIDER NOTE - PHYSICAL EXAMINATION
Const: AOX3 nontoxic appearing, no apparent respiratory or physical distress   HEENT: NC/AT. Moist mucous membranes.  Eyes: RAFFI. EOMI  Neck: Soft and supple. Full ROM without pain.  Cardiac: Regular rate and regular rhythm. +S1/S2. No murmurs. Peripheral pulses 2+ and symmetric. No LE edema.  Resp: Speaking in full sentences. No evidence of respiratory distress. No wheezes, rales or rhonchi. No adventitious breath sounds   Abd: Soft, non-tender, non-distended. Normal bowel sounds in all 4 quadrants. No guarding or rebound.  Back: Spine midline and non-tender. No CVAT.  Skin: No rashes, abrasions or lacerations.  Lymph: No cervical lymphadenopathy.  Neuro: Awake, alert & oriented x 3. Moves all extremities symmetrically.

## 2023-01-22 NOTE — H&P ADULT - NSHPPHYSICALEXAM_GEN_ALL_CORE
Vital Signs Last 24 Hrs  T(C): 37.4 (22 Jan 2023 06:56), Max: 37.4 (22 Jan 2023 06:56)  T(F): 99.3 (22 Jan 2023 06:56), Max: 99.3 (22 Jan 2023 06:56)  HR: 105 (22 Jan 2023 06:56) (100 - 105)  BP: 129/84 (22 Jan 2023 06:56) (128/75 - 129/84)  RR: 16 (22 Jan 2023 06:56) (16 - 18)  SpO2: 99% (22 Jan 2023 06:56) (99% - 100%)    Parameters below as of 22 Jan 2023 06:56  Patient On (Oxygen Delivery Method): room air    PHYSICAL EXAM:  Gen: pale, NAD  Neuro: A&Ox3  Resp: breathing comfortably on RA  Abdomen: mild lower pelvic tenderness to palpation  PELVIC:        EXTERNAL GENITALIA: Normal. No rashes or lesions noted.         VAGINA: healthy pink mucosa, no gross lesions, green/yellow discharge noted, no blood noted.          CERVIX: no lesions. closed, no active bleeding through os        ADNEXA: no adnexal masses or tenderness appreciated.

## 2023-01-22 NOTE — ED PROVIDER NOTE - CLINICAL SUMMARY MEDICAL DECISION MAKING FREE TEXT BOX
47 y.o female Pmh of OA and DUB S.p procedure done By Her GYN DR haro on 1/6/22 present in ER  and c.o sever abdominal pain over supra pubic area over last night and nausea that she took Ibuprofen did not helped . pt states pain goes to her upper abdomen as  well. felt nausea or vomiting     will obtain labs cbc , cmp lipase , type and screen - pain control - ivf and morphine and zofran US transvaginal; 47 y.o female Pmh of OA and DUB S.p procedure done By Her GYN DR haro on 1/6/22 present in ER  and c.o sever abdominal pain over supra pubic area over last night and nausea that she took Ibuprofen did not helped . pt states pain goes to her upper abdomen as  well. felt nausea or vomiting     will obtain labs cbc , cmp lipase , type and screen - pain control - ivf and morphine and zofran US transvaginal;    George BLANK @ 9:40-- Pt seen and evaluated by GYN. To be admitted to Dr. Asencio

## 2023-01-22 NOTE — ED PROVIDER NOTE - ATTENDING APP SHARED VISIT CONTRIBUTION OF CARE
47-year-old female -0-1-4 LMP 2022 presents with pelvic pain nausea chills and foul-smelling vaginal discharge status post hysterectomy on 2023.  Patient with CT showing pyosalpinx versus TOA/PID leukocytosis hemodynamically stable at this time treat with broad-spectrum IV antibiotics as well as GYN consult.    I personally saw the patient with the PA, and completed the key components of the history and physical exam. I then discussed the management plan with the PA.

## 2023-01-22 NOTE — ED PROVIDER NOTE - CONSTITUTIONAL [-], MLM
IV Contrast- Discharge Instructions After Your CT Scan      The IV contrast you received today will be filtered from your bloodstream by your kidneys during the next 24 hours and pass from the body in urine.  You will not be aware of this process and your urine will not change in color.  To help this process you should drink at least 4 additional glasses of water or juice today.  This reduces stress on your kidneys.    Most contrast reactions are immediate.  Should you develop symptoms of concern after discharge, contact the department at the number below.  After hours you should contact your personal physician.  If you develop breathing distress or wheezing, call 911.         no fever

## 2023-01-22 NOTE — H&P ADULT - HISTORY OF PRESENT ILLNESS
HPI:     47y   Last Menstrual Period 22   presented to the ED c/o increasing pelvic pain associated with nausea, chills, and foul smelling vaginal discharge s/p hysteroscopy on 23. Imaging was suspicious for pyosalpinx vs. TOA, so she was started on triple abx and gyn was consulted. At bedside, pt reports that her pain has been increasing for the past few days and was a 10/10 last night, which prompted her to present to the ED. She notes associated nausea, denies vomiting. Reports chills, has not checked her temperature at home. Was not taking any pain medications at home. States she is unsure if she had her menstrual period in January given the expected post-procedural bleeding. Has no other complaints.    PMHX; osteoarthritis  PSHX; bilateral salpingectomy, carpal tunnel release, hysteroscopy   POBHX; NSVDx4, SAbx1  PGYNHX: denies history of abnormal pap, STDs; +fibroid uterus  SOCIAL: denies tobacco, alcohol, illicit drug use  Allergies    No Known Allergies    MEDS:  sodium chloride 0.9%. 1000 milliLiter(s) IV Continuous <Continuous>       HPI:     47y   Last Menstrual Period 22   presented to the ED c/o increasing pelvic pain associated with nausea, chills, and foul smelling vaginal discharge s/p hysteroscopy on 23. Imaging was suspicious for pyosalpinx vs. TOA, so she was started on triple abx and gyn was consulted. At bedside, pt reports that her pain has been increasing for the past few days and was a 10/10 last night, which prompted her to present to the ED. She notes associated nausea, denies vomiting. Reports chills, has not checked her temperature at home. Was not taking any pain medications at home. States she is unsure if she had her menstrual period in January given the expected post-procedural bleeding. Has no other complaints.    PMHX; osteoarthritis  PSHX; bilateral tubal ligation, carpal tunnel release, hysteroscopy   POBHX; NSVDx4, SAbx1  PGYNHX: denies history of abnormal pap, STDs; +fibroid uterus  SOCIAL: denies tobacco, alcohol, illicit drug use  Allergies    No Known Allergies    MEDS:  sodium chloride 0.9%. 1000 milliLiter(s) IV Continuous <Continuous>

## 2023-01-22 NOTE — H&P ADULT - ASSESSMENT
47y  admitted for management of TOA.   - VSS, afebrile  - WBC 22   - received rocephin, flagyl, doxy in ED  - admit to gyn for TOA manamagement  - continue with IV antibiotics  - monitor fever curve  - consider repeat imaging/IR drainage if no improvement in signs/symptoms within 48 hours  - SCDs when in bed, start subcutaneous heparin on HD#3  - keep type and screen active  - regular diet    discussed with Dr. Luis M MD1   47y  admitted for management of TOA.   - VSS, afebrile  - WBC 22   - received rocephin, flagyl, doxy in ED  - admit to gyn for TOA manamagement  - continue with IV antibiotics  - trend WBC  - monitor fever curve  - consider repeat imaging/IR drainage if no improvement in signs/symptoms within 48 hours  - SCDs when in bed, start subcutaneous heparin on HD#3  - keep type and screen active  - regular diet    discussed with Dr. Luis M MD1   47y  admitted for management of TOA.   - VSS, afebrile  - WBC 22   - received rocephin, flagyl, doxy in ED  - admit to gyn for TOA manamagement  - continue with IV antibiotics  - trend WBC  - monitor fever curve  - follow up vaginitis panel, GCCT  - consider repeat imaging/IR drainage if no improvement in signs/symptoms within 48 hours  - SCDs when in bed, start subcutaneous heparin on HD#3  - keep type and screen active  - regular diet    discussed with Dr. Luis M MD1   47y  admitted for management of TOA.   - VSS, afebrile  - WBC 22   - received rocephin, flagyl, doxy in ED  - admit to gyn for TOA manamagement  - continue with IV antibiotics  - trend WBC  - monitor fever curve  - follow up vaginitis panel, GCCT  - consider repeat imaging/IR drainage if no improvement in signs/symptoms within 48 hours  - SCDs when in bed, start subcutaneous heparin on HD#3  - keep type and screen active  - regular diet    discussed with Dr. Luis M MD1    Attending Addendum    I agree with the documentation above by the resident physician.  In short, Kolby is a 46 y/o  who presented to the emergency room with increasing pelvic pain, nausea, chills, and foul smelling vaginal discharge.  She is status post a hysteroscopic endometrial ablation  on 23.  On examination she is mildly tender to palpation in the right and left lower quadrants of her abdomen.  As above, imaging was suspicious for a pyosalpinx vs. tubo-ovarian abscess.      She currently has a leukocytosis of 22K and is afebrile.  She has been started on triple antibiotics of ceftriaxone, doxycycline, and flagyl.  Her appendix was seen on imaging and was normal in appearance.  Blood cultures have been sent and are pending.  We will plan on close inpatient management at this time.  If her symptoms do not improve with antibiotic management she may require IR drainage or surgical exploration.  All of her questions were answered.    Jayy Asencio MD

## 2023-01-22 NOTE — ED PROVIDER NOTE - OBJECTIVE STATEMENT
47 y.o female Pmh of OA and DUB S.p procedure done By Her GYN DR haro on 1/6/22 present in ER  and c.o sever abdominal pain over supra pubic area over last night and nausea that she took Ibuprofen did not helped . pt states pain goes to her upper abdomen as  well. felt nausea or vomiting - . pt states she is erickson told by OBGYn that she will have dark blood vaginal d.c after procedure that will be goes away . admit chills . 47 y.o female Pmh of OA and DUB S.p procedure done By Her GYN DR haro on 1/6/22 present in ER  and c.o sever abdominal pain over supra pubic area over last night and nausea that she took Ibuprofen did not helped . pt states pain goes to her upper abdomen as  well. felt nausea or vomiting - . pt states she is erickson told by OBGYn that she will have dark blood vaginal d.c after procedure that will be goes away . admit chills .  Harpreet  47 y.o female Pmh of OA and DUB S.p procedure done By Her GYN DR haro on 1/6/22 present in ER  and c.o sever abdominal pain over supra pubic area over last night and nausea that she took Ibuprofen did not helped . pt states she had pain on and off after procedure. pt states pain goes to her upper abdomen as  well. felt nausea or vomiting - . pt states she is erickson told by OBGYn that she will have dark blood vaginal d.c after procedure that will be goes away . admit chills .  Harpreet  47 y.o female Pmh of OA and DUB S.p procedure done By Her GYN DR haro on 1/6/22 present in ER  and c.o sever abdominal pain over supra pubic/ pelvis area over last night and nausea that she took Ibuprofen did not helped . pt states she had pain on and off after procedure. pt states pain goes to her upper abdomen as  well. felt nausea W.o vomiting . pt states she is been told by OBGYn that she will have dark blood vaginal d.c after procedure that will be goes away and nomial . admit chills  denies any fever   Harpreet

## 2023-01-22 NOTE — ED ADULT TRIAGE NOTE - CHIEF COMPLAINT QUOTE
pt c/o of severe pain in uterus x 1 hour. Pt had a procedure done few weeks ago to "wash out her uterus"

## 2023-01-22 NOTE — ED ADULT NURSE NOTE - OBJECTIVE STATEMENT
pt is a 47y female aox4, ambulatory.  c/o pelvic pain x1 hr, recently had gyn procedure.  reports no relief with ibuprofen.  states her pain radiates to abd with n/v.  no s.s acute distress noted.   currently has vaginal bleeding which is said to be normal by her gyn

## 2023-01-22 NOTE — ED PROVIDER NOTE - PROGRESS NOTE DETAILS
pt has elevated Wbc . added Ct of the abd and pelvis Ct consistent With PID / Pyosalpinx   called GYn pending US  start the pt on antibiotic as well Ct consistent With PID / Pyosalpinx   called GYN pending US and they wants call after us result   start the pt on antibiotic as well Pt is sign out to ABHAY Davis pending US and consult again GYN

## 2023-01-22 NOTE — ED PROVIDER NOTE - NS ED ATTENDING STATEMENT MOD
This was a shared visit with the BIPIN. I reviewed and verified the documentation and independently performed the documented:

## 2023-01-23 LAB
ALBUMIN SERPL ELPH-MCNC: 2.9 G/DL — LOW (ref 3.3–5.2)
ALP SERPL-CCNC: 105 U/L — SIGNIFICANT CHANGE UP (ref 40–120)
ALT FLD-CCNC: 17 U/L — SIGNIFICANT CHANGE UP
ANION GAP SERPL CALC-SCNC: 11 MMOL/L — SIGNIFICANT CHANGE UP (ref 5–17)
AST SERPL-CCNC: 17 U/L — SIGNIFICANT CHANGE UP
BASOPHILS # BLD AUTO: 0.05 K/UL — SIGNIFICANT CHANGE UP (ref 0–0.2)
BASOPHILS NFR BLD AUTO: 0.3 % — SIGNIFICANT CHANGE UP (ref 0–2)
BILIRUB SERPL-MCNC: 0.5 MG/DL — SIGNIFICANT CHANGE UP (ref 0.4–2)
BUN SERPL-MCNC: 4.8 MG/DL — LOW (ref 8–20)
C TRACH RRNA SPEC QL NAA+PROBE: SIGNIFICANT CHANGE UP
CALCIUM SERPL-MCNC: 8 MG/DL — LOW (ref 8.4–10.5)
CANDIDA AB TITR SER: SIGNIFICANT CHANGE UP
CHLORIDE SERPL-SCNC: 106 MMOL/L — SIGNIFICANT CHANGE UP (ref 96–108)
CO2 SERPL-SCNC: 19 MMOL/L — LOW (ref 22–29)
CREAT SERPL-MCNC: 0.54 MG/DL — SIGNIFICANT CHANGE UP (ref 0.5–1.3)
EGFR: 115 ML/MIN/1.73M2 — SIGNIFICANT CHANGE UP
EOSINOPHIL # BLD AUTO: 0.09 K/UL — SIGNIFICANT CHANGE UP (ref 0–0.5)
EOSINOPHIL NFR BLD AUTO: 0.5 % — SIGNIFICANT CHANGE UP (ref 0–6)
G VAGINALIS DNA SPEC QL NAA+PROBE: DETECTED
GLUCOSE SERPL-MCNC: 102 MG/DL — HIGH (ref 70–99)
HCT VFR BLD CALC: 30.5 % — LOW (ref 34.5–45)
HGB BLD-MCNC: 9.8 G/DL — LOW (ref 11.5–15.5)
IMM GRANULOCYTES NFR BLD AUTO: 1.3 % — HIGH (ref 0–0.9)
LYMPHOCYTES # BLD AUTO: 1.55 K/UL — SIGNIFICANT CHANGE UP (ref 1–3.3)
LYMPHOCYTES # BLD AUTO: 7.9 % — LOW (ref 13–44)
MCHC RBC-ENTMCNC: 25.3 PG — LOW (ref 27–34)
MCHC RBC-ENTMCNC: 32.1 GM/DL — SIGNIFICANT CHANGE UP (ref 32–36)
MCV RBC AUTO: 78.6 FL — LOW (ref 80–100)
MONOCYTES # BLD AUTO: 1.17 K/UL — HIGH (ref 0–0.9)
MONOCYTES NFR BLD AUTO: 6 % — SIGNIFICANT CHANGE UP (ref 2–14)
N GONORRHOEA RRNA SPEC QL NAA+PROBE: SIGNIFICANT CHANGE UP
NEUTROPHILS # BLD AUTO: 16.49 K/UL — HIGH (ref 1.8–7.4)
NEUTROPHILS NFR BLD AUTO: 84 % — HIGH (ref 43–77)
PLATELET # BLD AUTO: 311 K/UL — SIGNIFICANT CHANGE UP (ref 150–400)
POTASSIUM SERPL-MCNC: 3.2 MMOL/L — LOW (ref 3.5–5.3)
POTASSIUM SERPL-SCNC: 3.2 MMOL/L — LOW (ref 3.5–5.3)
PROT SERPL-MCNC: 6.7 G/DL — SIGNIFICANT CHANGE UP (ref 6.6–8.7)
RBC # BLD: 3.88 M/UL — SIGNIFICANT CHANGE UP (ref 3.8–5.2)
RBC # FLD: 15.5 % — HIGH (ref 10.3–14.5)
SODIUM SERPL-SCNC: 136 MMOL/L — SIGNIFICANT CHANGE UP (ref 135–145)
SPECIMEN SOURCE: SIGNIFICANT CHANGE UP
T VAGINALIS SPEC QL WET PREP: SIGNIFICANT CHANGE UP
WBC # BLD: 19.6 K/UL — HIGH (ref 3.8–10.5)
WBC # FLD AUTO: 19.6 K/UL — HIGH (ref 3.8–10.5)

## 2023-01-23 PROCEDURE — 74181 MRI ABDOMEN W/O CONTRAST: CPT | Mod: 26

## 2023-01-23 RX ORDER — POTASSIUM CHLORIDE 20 MEQ
20 PACKET (EA) ORAL
Refills: 0 | Status: COMPLETED | OUTPATIENT
Start: 2023-01-23 | End: 2023-01-24

## 2023-01-23 RX ADMIN — Medication 100 MILLIGRAM(S): at 07:56

## 2023-01-23 RX ADMIN — Medication 650 MILLIGRAM(S): at 08:43

## 2023-01-23 RX ADMIN — Medication 650 MILLIGRAM(S): at 08:13

## 2023-01-23 RX ADMIN — Medication 100 MILLIGRAM(S): at 18:00

## 2023-01-23 RX ADMIN — Medication 100 MILLIGRAM(S): at 19:37

## 2023-01-23 RX ADMIN — Medication 20 MILLIEQUIVALENT(S): at 23:28

## 2023-01-23 RX ADMIN — Medication 600 MILLIGRAM(S): at 19:37

## 2023-01-23 RX ADMIN — CEFTRIAXONE 1000 MILLIGRAM(S): 500 INJECTION, POWDER, FOR SOLUTION INTRAMUSCULAR; INTRAVENOUS at 06:10

## 2023-01-23 RX ADMIN — Medication 600 MILLIGRAM(S): at 20:30

## 2023-01-23 RX ADMIN — Medication 100 MILLIGRAM(S): at 06:03

## 2023-01-23 NOTE — PROGRESS NOTE ADULT - SUBJECTIVE AND OBJECTIVE BOX
ERIC PUTON is a 46yo  now HD#2  admitted for management of suspected TOA s/p hysteroscopy     S:    Afebrile overnight. Mild tenderness to palpation in RLQ, declines pain medication at this time.   The patient is doing well, reports sharp vaginal pain with urination   Tolerating PO. Ambulating without difficulty.     O:   T(C): 37.6 (23 @ 22:30), Max: 37.8 (23 @ 17:37)  HR: 93 (23 @ 22:30) (92 - 108)  BP: 113/77 (23 @ 22:30) (102/66 - 129/84)  RR: 16 (23 @ 22:30) (16 - 18)  SpO2: 98% (23 @ 22:30) (97% - 99%)    Gen: NAD, AOx3, resting comfortably on room air   Abdomen: soft, mild tenderness to palpation in RLQ   Extrem: no calf tenderness, edema     Labs:                         11.9   22. )-----------( 393      ( 2023 01:50 )             37.3         134<L>  |  101  |  8.3  ----------------------------<  132<H>  3.6   |  22.0  |  0.60    Ca    8.5      2023 01:50    TPro  7.8  /  Alb  3.8  /  TBili  0.4  /  DBili  x   /  AST  16  /  ALT  14  /  AlkPhos  129<H>  23 @ 07:01  -  23 @ 06:21  --------------------------------------------------------  IN: 375 mL / OUT: 0 mL / NET: 375 mL                         ERIC UPTON is a 48yo  now HD#2  admitted for management of suspected TOA s/p hysteroscopy     S:    Afebrile overnight. Mild tenderness to palpation in RLQ, declines pain medication at this time.  States symptoms have improved from admission  The patient is doing well, reports sharp vaginal pain with urination   Tolerating PO. Ambulating without difficulty.     O:   T(C): 37.6 (23 @ 22:30), Max: 37.8 (23 @ 17:37)  HR: 93 (23 @ 22:30) (92 - 108)  BP: 113/77 (23 @ 22:30) (102/66 - 129/84)  RR: 16 (23 @ 22:30) (16 - 18)  SpO2: 98% (23 @ 22:30) (97% - 99%)    Gen: NAD, AOx3, resting comfortably on room air   Abdomen: soft, mild tenderness to palpation in RLQ   Extrem: no calf tenderness, edema     Labs:                         11.9   22.26 )-----------( 393      ( 2023 01:50 )             37.3         134<L>  |  101  |  8.3  ----------------------------<  132<H>  3.6   |  22.0  |  0.60    Ca    8.5      2023 01:50    TPro  7.8  /  Alb  3.8  /  TBili  0.4  /  DBili  x   /  AST  16  /  ALT  14  /  AlkPhos  129<H>  23 @ 07:01  -  23 @ 06:21  --------------------------------------------------------  IN: 375 mL / OUT: 0 mL / NET: 375 mL

## 2023-01-23 NOTE — PROGRESS NOTE ADULT - ASSESSMENT
A/P ERIC UPTON is a 48yo  now HD#2  admitted for management of suspected TOA s/p hysteroscopy     Gen: VSS, afebrile overnight   CV/Pulm: Resting comfortably on room air   GI: Patient tolerating regular diet, bowel function normal. Pending MRCP for finding of minimal CBD dilatation of uncertain etiology.  : Voiding spontaneously   Heme: AM labs pending  ID: AM labs pending pending blood and urine culture results. Pending vaginitis panel and gonorrhea/chlamydia. Plan to continue with IV Flagyl 500mg BID and IV doxycyline 100mg BID   DVT ppx: ambulation encouraged, SCDs when not ambulating  Dispo: Continue with inpatient management  A/P ERIC UPTON is a 46yo  now HD#2  admitted for management of suspected TOA s/p hysteroscopy   HD#2    Gen: VSS, afebrile overnight   CV/Pulm: Resting comfortably on room air   GI: Patient tolerating regular diet, bowel function normal. Pending MRCP for finding of minimal CBD dilatation of uncertain etiology.  : Voiding spontaneously   Heme: AM labs pending  ID: AM labs pending, pending blood and urine culture results. Pending vaginitis panel and gonorrhea/chlamydia. Plan to continue IV Rocephin, Doxy, Flagyl.  DVT ppx: ambulation encouraged, SCDs when not ambulating  Dispo: Continue with inpatient management     Addendum Reza PGY4 - patient seen at bedside with intern, agree with assessment A/P ERIC UPTON is a 48yo  now HD#2  admitted for management of suspected TOA s/p hysteroscopy   HD#2    Gen: VSS, afebrile overnight   CV/Pulm: Resting comfortably on room air   GI: Patient tolerating regular diet, bowel function normal. Pending MRCP for finding of minimal CBD dilatation of uncertain etiology.  : Voiding spontaneously   Heme: AM labs pending  ID: AM labs pending, pending blood and urine culture results. Pending vaginitis panel and gonorrhea/chlamydia. Plan to continue IV Rocephin, Doxy, Flagyl.  DVT ppx: ambulation encouraged, SCDs when not ambulating  Dispo: Continue with inpatient management     Addendum Reza PGY4 - patient seen at bedside with PGY1, agree with assessment

## 2023-01-24 LAB
ANION GAP SERPL CALC-SCNC: 11 MMOL/L — SIGNIFICANT CHANGE UP (ref 5–17)
BASOPHILS # BLD AUTO: 0.06 K/UL — SIGNIFICANT CHANGE UP (ref 0–0.2)
BASOPHILS NFR BLD AUTO: 0.4 % — SIGNIFICANT CHANGE UP (ref 0–2)
BUN SERPL-MCNC: 5.5 MG/DL — LOW (ref 8–20)
CALCIUM SERPL-MCNC: 8.2 MG/DL — LOW (ref 8.4–10.5)
CHLORIDE SERPL-SCNC: 108 MMOL/L — SIGNIFICANT CHANGE UP (ref 96–108)
CO2 SERPL-SCNC: 20 MMOL/L — LOW (ref 22–29)
CREAT SERPL-MCNC: 0.47 MG/DL — LOW (ref 0.5–1.3)
CULTURE RESULTS: SIGNIFICANT CHANGE UP
EGFR: 119 ML/MIN/1.73M2 — SIGNIFICANT CHANGE UP
EOSINOPHIL # BLD AUTO: 0.16 K/UL — SIGNIFICANT CHANGE UP (ref 0–0.5)
EOSINOPHIL NFR BLD AUTO: 1 % — SIGNIFICANT CHANGE UP (ref 0–6)
GLUCOSE SERPL-MCNC: 116 MG/DL — HIGH (ref 70–99)
HCT VFR BLD CALC: 30.7 % — LOW (ref 34.5–45)
HGB BLD-MCNC: 9.6 G/DL — LOW (ref 11.5–15.5)
IMM GRANULOCYTES NFR BLD AUTO: 1.2 % — HIGH (ref 0–0.9)
LYMPHOCYTES # BLD AUTO: 1.84 K/UL — SIGNIFICANT CHANGE UP (ref 1–3.3)
LYMPHOCYTES # BLD AUTO: 11.4 % — LOW (ref 13–44)
MCHC RBC-ENTMCNC: 24.6 PG — LOW (ref 27–34)
MCHC RBC-ENTMCNC: 31.3 GM/DL — LOW (ref 32–36)
MCV RBC AUTO: 78.7 FL — LOW (ref 80–100)
MONOCYTES # BLD AUTO: 1.08 K/UL — HIGH (ref 0–0.9)
MONOCYTES NFR BLD AUTO: 6.7 % — SIGNIFICANT CHANGE UP (ref 2–14)
NEUTROPHILS # BLD AUTO: 12.84 K/UL — HIGH (ref 1.8–7.4)
NEUTROPHILS NFR BLD AUTO: 79.3 % — HIGH (ref 43–77)
PLATELET # BLD AUTO: 348 K/UL — SIGNIFICANT CHANGE UP (ref 150–400)
POTASSIUM SERPL-MCNC: 3.6 MMOL/L — SIGNIFICANT CHANGE UP (ref 3.5–5.3)
POTASSIUM SERPL-SCNC: 3.6 MMOL/L — SIGNIFICANT CHANGE UP (ref 3.5–5.3)
RBC # BLD: 3.9 M/UL — SIGNIFICANT CHANGE UP (ref 3.8–5.2)
RBC # FLD: 15.5 % — HIGH (ref 10.3–14.5)
SODIUM SERPL-SCNC: 139 MMOL/L — SIGNIFICANT CHANGE UP (ref 135–145)
SPECIMEN SOURCE: SIGNIFICANT CHANGE UP
WBC # BLD: 16.17 K/UL — HIGH (ref 3.8–10.5)
WBC # FLD AUTO: 16.17 K/UL — HIGH (ref 3.8–10.5)

## 2023-01-24 RX ORDER — LEFLUNOMIDE 10 MG/1
20 TABLET ORAL ONCE
Refills: 0 | Status: DISCONTINUED | OUTPATIENT
Start: 2023-01-24 | End: 2023-01-24

## 2023-01-24 RX ORDER — LEFLUNOMIDE 10 MG/1
20 TABLET ORAL DAILY
Refills: 0 | Status: DISCONTINUED | OUTPATIENT
Start: 2023-01-25 | End: 2023-01-25

## 2023-01-24 RX ADMIN — CEFTRIAXONE 1000 MILLIGRAM(S): 500 INJECTION, POWDER, FOR SOLUTION INTRAMUSCULAR; INTRAVENOUS at 05:59

## 2023-01-24 RX ADMIN — Medication 650 MILLIGRAM(S): at 20:44

## 2023-01-24 RX ADMIN — Medication 600 MILLIGRAM(S): at 18:39

## 2023-01-24 RX ADMIN — Medication 20 MILLIEQUIVALENT(S): at 03:06

## 2023-01-24 RX ADMIN — Medication 650 MILLIGRAM(S): at 21:49

## 2023-01-24 RX ADMIN — Medication 650 MILLIGRAM(S): at 05:59

## 2023-01-24 RX ADMIN — Medication 100 MILLIGRAM(S): at 07:27

## 2023-01-24 RX ADMIN — Medication 650 MILLIGRAM(S): at 07:02

## 2023-01-24 RX ADMIN — Medication 100 MILLIGRAM(S): at 18:39

## 2023-01-24 RX ADMIN — Medication 100 MILLIGRAM(S): at 20:43

## 2023-01-24 RX ADMIN — Medication 600 MILLIGRAM(S): at 19:30

## 2023-01-24 RX ADMIN — Medication 100 MILLIGRAM(S): at 05:59

## 2023-01-24 NOTE — PROGRESS NOTE ADULT - ASSESSMENT
A/P ERIC UPTON is a 48yo  now HD#3  admitted for management of suspected TOA s/p hysteroscopy       Gen: VSS, afebrile overnight   CV/Pulm: Resting comfortably on room air   GI: Patient tolerating regular diet, bowel function normal. Pending MRCP results for finding of minimal CBD dilatation of uncertain etiology.  : Voiding spontaneously   Heme: AM labs pending  ID: AM labs pending, pending blood and urine culture results. No growth to date on preliminary results  Negative for gonorrhea/chlamydia. Affirm positive for Gardnerella  Plan to continue IV Rocephin, Doxy, Flagyl.  DVT ppx: ambulation encouraged, SCDs when not ambulating  Dispo: Continue with inpatient management  A/P ERIC UPTON is a 46yo  now HD#3  admitted for management of suspected TOA s/p hysteroscopy       Gen: VSS, afebrile overnight   CV/Pulm: Resting comfortably on room air   GI: Patient tolerating regular diet, bowel function normal. Pending MRCP results for finding of minimal CBD dilatation of uncertain etiology.  : Voiding spontaneously   Heme: AM labs pending  ID: AM labs pending, pending blood and urine culture results. No growth to date on preliminary results  Negative for gonorrhea/chlamydia. Affirm positive for Gardnerella  Plan to continue IV Rocephin, Doxy, Flagyl.  DVT ppx: ambulation encouraged, SCDs when not ambulating  Dispo: Continue with inpatient management. Discussed with Dr. Gonzalez, who would like continued inpatient mgmt until cultures all resulted and leukocytosis resolves    Addendum Reza PGY4:  - Pt seen at bedside with PGY1. Agree with plan.   - MRCP with mildly dilated CBD to 10mm without stricture or mass. Will consider GI consult.   - Continued leukocytosis however improving and afebrile, will continue with AM labs  - Continue abx IV

## 2023-01-24 NOTE — PROGRESS NOTE ADULT - SUBJECTIVE AND OBJECTIVE BOX
ERIC UPTON is a 48yo  now HD#3  admitted for management of suspected TOA s/p hysteroscopy     S:    Afebrile overnight. Patient states that she is feeling much better. Tenderness on abdomen as also improved.   Received PO K+ overnight for K+ repletion   The patient is doing well, no complaints overnight   Tolerating PO. Ambulating without difficulty.     O:   Vital Signs Last 24 Hrs  T(C): 36.6 (2023 06:11), Max: 37.3 (2023 08:00)  T(F): 97.8 (2023 06:11), Max: 99.2 (2023 08:00)  HR: 80 (2023 06:11) (80 - 93)  BP: 110/75 (2023 06:11) (103/68 - 126/80)  RR: 17 (2023 06:11) (16 - 18)  SpO2: 98% (2023 06:11) (97% - 99%)    Gen: NAD, AOx3, resting comfortably on room air   Abdomen: soft, mild tenderness to palpation in RLQ   Extrem: no calf tenderness, edema    Labs:                         9.8    19.60 )-----------( 311      ( 2023 06:18 )             30.5         136  |  106  |  4.8<L>  ----------------------------<  102<H>  3.2<L>   |  19.0<L>  |  0.54    Ca    8.0<L>      2023 06:18    TPro  6.7  /  Alb  2.9<L>  /  TBili  0.5  /  DBili  x   /  AST  17  /  ALT  17  /  AlkPhos  105  23 @ 07:01  -  23 @ 07:00  --------------------------------------------------------  IN: 375 mL / OUT: 0 mL / NET: 375 mL    23 @ 07:01  -  01-24-23 @ 06:28  --------------------------------------------------------  IN: 100 mL / OUT: 0 mL / NET: 100 mL

## 2023-01-24 NOTE — CHART NOTE - NSCHARTNOTEFT_GEN_A_CORE
Patient called after nurse made team aware that she had questions about test results. Conversation had in Italian, which is patient's preferred language.   Patient was explained results of MRCP. Plan for possible GI consult inpatient vs outpatient; to be decided by GYN team in AM.   All questions answered to patient's satisfaction.   Patient requests  for all conversations moving forward. Patient called after nurse made team aware that she had questions about test results. Conversation had in [place], which is patient's preferred language.   Patient was explained results of MRCP. Plan for possible GI consult inpatient vs outpatient; to be decided by GYN team in AM.   All questions answered to patient's satisfaction.   Patient requests  for all conversations moving forward.  Home medication (leflunomide) ordered. Weekly injection will hold for now. Not in house, partner will bring in tomorrow.

## 2023-01-25 ENCOUNTER — TRANSCRIPTION ENCOUNTER (OUTPATIENT)
Age: 48
End: 2023-01-25

## 2023-01-25 VITALS
HEART RATE: 92 BPM | DIASTOLIC BLOOD PRESSURE: 86 MMHG | RESPIRATION RATE: 16 BRPM | TEMPERATURE: 98 F | SYSTOLIC BLOOD PRESSURE: 129 MMHG | OXYGEN SATURATION: 98 %

## 2023-01-25 LAB
ANION GAP SERPL CALC-SCNC: 11 MMOL/L — SIGNIFICANT CHANGE UP (ref 5–17)
BASOPHILS # BLD AUTO: 0.07 K/UL — SIGNIFICANT CHANGE UP (ref 0–0.2)
BASOPHILS NFR BLD AUTO: 0.5 % — SIGNIFICANT CHANGE UP (ref 0–2)
BUN SERPL-MCNC: 7.3 MG/DL — LOW (ref 8–20)
CALCIUM SERPL-MCNC: 8.6 MG/DL — SIGNIFICANT CHANGE UP (ref 8.4–10.5)
CHLORIDE SERPL-SCNC: 106 MMOL/L — SIGNIFICANT CHANGE UP (ref 96–108)
CO2 SERPL-SCNC: 23 MMOL/L — SIGNIFICANT CHANGE UP (ref 22–29)
CREAT SERPL-MCNC: 0.45 MG/DL — LOW (ref 0.5–1.3)
EGFR: 120 ML/MIN/1.73M2 — SIGNIFICANT CHANGE UP
EOSINOPHIL # BLD AUTO: 0.13 K/UL — SIGNIFICANT CHANGE UP (ref 0–0.5)
EOSINOPHIL NFR BLD AUTO: 1 % — SIGNIFICANT CHANGE UP (ref 0–6)
GLUCOSE SERPL-MCNC: 98 MG/DL — SIGNIFICANT CHANGE UP (ref 70–99)
HCT VFR BLD CALC: 31.9 % — LOW (ref 34.5–45)
HGB BLD-MCNC: 10.3 G/DL — LOW (ref 11.5–15.5)
IMM GRANULOCYTES NFR BLD AUTO: 1.4 % — HIGH (ref 0–0.9)
LYMPHOCYTES # BLD AUTO: 15.6 % — SIGNIFICANT CHANGE UP (ref 13–44)
LYMPHOCYTES # BLD AUTO: 2.01 K/UL — SIGNIFICANT CHANGE UP (ref 1–3.3)
MCHC RBC-ENTMCNC: 25.1 PG — LOW (ref 27–34)
MCHC RBC-ENTMCNC: 32.3 GM/DL — SIGNIFICANT CHANGE UP (ref 32–36)
MCV RBC AUTO: 77.8 FL — LOW (ref 80–100)
MONOCYTES # BLD AUTO: 1.05 K/UL — HIGH (ref 0–0.9)
MONOCYTES NFR BLD AUTO: 8.1 % — SIGNIFICANT CHANGE UP (ref 2–14)
NEUTROPHILS # BLD AUTO: 9.46 K/UL — HIGH (ref 1.8–7.4)
NEUTROPHILS NFR BLD AUTO: 73.4 % — SIGNIFICANT CHANGE UP (ref 43–77)
PLATELET # BLD AUTO: 348 K/UL — SIGNIFICANT CHANGE UP (ref 150–400)
POTASSIUM SERPL-MCNC: 3.7 MMOL/L — SIGNIFICANT CHANGE UP (ref 3.5–5.3)
POTASSIUM SERPL-SCNC: 3.7 MMOL/L — SIGNIFICANT CHANGE UP (ref 3.5–5.3)
RBC # BLD: 4.1 M/UL — SIGNIFICANT CHANGE UP (ref 3.8–5.2)
RBC # FLD: 15.6 % — HIGH (ref 10.3–14.5)
SODIUM SERPL-SCNC: 140 MMOL/L — SIGNIFICANT CHANGE UP (ref 135–145)
WBC # BLD: 12.9 K/UL — HIGH (ref 3.8–10.5)
WBC # FLD AUTO: 12.9 K/UL — HIGH (ref 3.8–10.5)

## 2023-01-25 PROCEDURE — 87637 SARSCOV2&INF A&B&RSV AMP PRB: CPT

## 2023-01-25 PROCEDURE — 86900 BLOOD TYPING SEROLOGIC ABO: CPT

## 2023-01-25 PROCEDURE — 87491 CHLMYD TRACH DNA AMP PROBE: CPT

## 2023-01-25 PROCEDURE — 87591 N.GONORRHOEAE DNA AMP PROB: CPT

## 2023-01-25 PROCEDURE — 87086 URINE CULTURE/COLONY COUNT: CPT

## 2023-01-25 PROCEDURE — 86901 BLOOD TYPING SEROLOGIC RH(D): CPT

## 2023-01-25 PROCEDURE — 80048 BASIC METABOLIC PNL TOTAL CA: CPT

## 2023-01-25 PROCEDURE — 96375 TX/PRO/DX INJ NEW DRUG ADDON: CPT

## 2023-01-25 PROCEDURE — 36415 COLL VENOUS BLD VENIPUNCTURE: CPT

## 2023-01-25 PROCEDURE — 81001 URINALYSIS AUTO W/SCOPE: CPT

## 2023-01-25 PROCEDURE — 74177 CT ABD & PELVIS W/CONTRAST: CPT | Mod: ME

## 2023-01-25 PROCEDURE — 85025 COMPLETE CBC W/AUTO DIFF WBC: CPT

## 2023-01-25 PROCEDURE — 83605 ASSAY OF LACTIC ACID: CPT

## 2023-01-25 PROCEDURE — 99285 EMERGENCY DEPT VISIT HI MDM: CPT

## 2023-01-25 PROCEDURE — 87040 BLOOD CULTURE FOR BACTERIA: CPT

## 2023-01-25 PROCEDURE — 86850 RBC ANTIBODY SCREEN: CPT

## 2023-01-25 PROCEDURE — 76830 TRANSVAGINAL US NON-OB: CPT

## 2023-01-25 PROCEDURE — 80053 COMPREHEN METABOLIC PANEL: CPT

## 2023-01-25 PROCEDURE — G1004: CPT

## 2023-01-25 PROCEDURE — 84702 CHORIONIC GONADOTROPIN TEST: CPT

## 2023-01-25 PROCEDURE — 74181 MRI ABDOMEN W/O CONTRAST: CPT

## 2023-01-25 PROCEDURE — 87800 DETECT AGNT MULT DNA DIREC: CPT

## 2023-01-25 PROCEDURE — 83690 ASSAY OF LIPASE: CPT

## 2023-01-25 PROCEDURE — 96374 THER/PROPH/DIAG INJ IV PUSH: CPT

## 2023-01-25 PROCEDURE — 76856 US EXAM PELVIC COMPLETE: CPT

## 2023-01-25 RX ORDER — METRONIDAZOLE 500 MG
2 TABLET ORAL
Qty: 56 | Refills: 0
Start: 2023-01-25 | End: 2023-02-07

## 2023-01-25 RX ORDER — LEFLUNOMIDE 10 MG/1
1 TABLET ORAL
Qty: 0 | Refills: 0 | DISCHARGE
Start: 2023-01-25

## 2023-01-25 RX ADMIN — CEFTRIAXONE 1000 MILLIGRAM(S): 500 INJECTION, POWDER, FOR SOLUTION INTRAMUSCULAR; INTRAVENOUS at 06:28

## 2023-01-25 RX ADMIN — Medication 100 MILLIGRAM(S): at 06:29

## 2023-01-25 NOTE — PROGRESS NOTE ADULT - ASSESSMENT
A/P ERIC UPTON is a 48yo  now HD#4  admitted for management of suspected TOA s/p hysteroscopy       Gen: VSS, afebrile overnight   CV/Pulm: Resting comfortably on room air   GI: Patient tolerating regular diet, bowel function normal.  MRCP with mildly dilated CBD to 10mm without stricture or mass. Consider outpatient GI work up.   : Voiding spontaneously   Heme: 9.8>9.6>10.3  ID: WBC: 22.2>19.6>16.17 >12.9   Pending final blood culture results. No growth to date on preliminary results  Urine Culture: positive for group B Step.   Negative for gonorrhea/chlamydia. Affirm positive for Gardnerella  Plan to discontinue IV Rocephin, Doxy, Flagyl and send PO doxycycline and flagyl to patient's pharmacy.  DVT ppx: ambulation encouraged, SCDs when not ambulating  Dispo: Discussed with Dr. Gonzalez, plan to transition to PO antibiotics and discharge home today with close follow up in the GYN office in 1 week.

## 2023-01-25 NOTE — PROGRESS NOTE ADULT - SUBJECTIVE AND OBJECTIVE BOX
ERIC UPTON is a 46yo  now HD#4  admitted for management of suspected TOA s/p hysteroscopy     S:    Afebrile overnight. Patient states that she is feeling "much better". Tenderness on abdomen has improved.   The patient is doing well, no complaints overnight   Tolerating PO. Ambulating without difficulty.     O:   Vital Signs Last 24 Hrs  T(C): 36.9 (2023 19:40), Max: 37.1 (2023 16:04)  T(F): 98.4 (2023 19:40), Max: 98.7 (2023 16:04)  HR: 94 (2023 19:40) (77 - 94)  BP: 114/76 (2023 19:40) (114/76 - 121/81)  RR: 16 (2023 19:40) (16 - 16)  SpO2: 98% (2023 19:40) (98% - 99%)      Gen: NAD, AOx3, resting comfortably on room air   Abdomen: soft, mild tenderness to palpation in RLQ   Extrem: no calf tenderness, edema      Labs:                         9.6    16.17 )-----------( 348      ( 2023 06:35 )             30.7         139  |  108  |  5.5<L>  ----------------------------<  116<H>  3.6   |  20.0<L>  |  0.47<L>    Ca    8.2<L>      2023 06:35        23 @ 07:01  -  23 @ 07:00  --------------------------------------------------------  IN: 100 mL / OUT: 0 mL / NET: 100 mL

## 2023-01-25 NOTE — DISCHARGE NOTE PROVIDER - NSDCFUSCHEDAPPT_GEN_ALL_CORE_FT
Abdullahi Gonzalez  [facility]  [facility] [address]  Scheduled Appointment: 02/17/2023

## 2023-01-25 NOTE — DISCHARGE NOTE PROVIDER - NSDCMRMEDTOKEN_GEN_ALL_CORE_FT
doxycycline hyclate 100 mg oral tablet: 1 tab(s) orally 2 times a day   leflunomide 20 mg oral tablet: 1 tab(s) orally once a day  metroNIDAZOLE 250 mg oral tablet: 2 tab(s) orally 2 times a day

## 2023-01-25 NOTE — DISCHARGE NOTE PROVIDER - HOSPITAL COURSE
Patient admitted for pelvic pain associated with foul smelling vaginal discharge, nausea, and chills following D&C hysteroscopy 3 weeks prior. Lab findings showed bacterial vaginosis and GBS bacteruria and imaging findings were consistent with likely diagnosis of tuboovarian abscess. Patient was treated with IV rocephin, doxycycline, and metronidazole, to which her symptoms responded appropriately. On discharge she reports resolution of discharge and improvement in pelvic pain, leukocytosis had decreased appropriately, and patient was persistently afebrile. During her stay incidental finding of CBD dilation with no evidence of choledocolithiasis was noted; patient to follow up outpatient with GI for continued management.

## 2023-01-25 NOTE — DISCHARGE NOTE PROVIDER - CARE PROVIDER_API CALL
Gilma Robles)  Gastroenterology  [address], [place] [id number]  Phone: [phone]  Fax: [phone]  Follow Up Time: Routine    Abdullahi Gonzalez)  Obstetrics and Gynecology  [address] [facility], [address]  [place] [address]  Phone: [phone]  Fax: [phone]  Follow Up Time: 1 week

## 2023-01-25 NOTE — DISCHARGE NOTE PROVIDER - PROVIDER TOKENS
PROVIDER:[TOKEN:[[id number]],FOLLOWUP:[Routine]],PROVIDER:[TOKEN:[[id number]],FOLLOWUP:[1 week]]

## 2023-01-25 NOTE — DISCHARGE NOTE NURSING/CASE MANAGEMENT/SOCIAL WORK - PATIENT PORTAL LINK FT
You can access the FollowMyHealth Patient Portal offered by [facility] by registering at the following website: [email or web address]. By joining [facility]’s FollowMyHealth portal, you will also be able to view your health information using other applications (apps) compatible with our system.

## 2023-01-27 LAB
CULTURE RESULTS: SIGNIFICANT CHANGE UP
CULTURE RESULTS: SIGNIFICANT CHANGE UP
SPECIMEN SOURCE: SIGNIFICANT CHANGE UP
SPECIMEN SOURCE: SIGNIFICANT CHANGE UP

## 2023-02-01 ENCOUNTER — APPOINTMENT (OUTPATIENT)
Dept: OBGYN | Facility: CLINIC | Age: 48
End: 2023-02-01
Payer: MEDICAID

## 2023-02-01 VITALS
WEIGHT: 151.6 LBS | DIASTOLIC BLOOD PRESSURE: 84 MMHG | HEIGHT: 66 IN | BODY MASS INDEX: 24.36 KG/M2 | SYSTOLIC BLOOD PRESSURE: 120 MMHG

## 2023-02-01 PROCEDURE — 99396 PREV VISIT EST AGE 40-64: CPT

## 2023-02-01 PROCEDURE — 99215 OFFICE O/P EST HI 40 MIN: CPT | Mod: 25

## 2023-02-01 NOTE — COUNSELING
[Nutrition/ Exercise/ Weight Management] : nutrition, exercise, weight management [Body Image] : body image [Vitamins/Supplements] : vitamins/supplements [Sunscreen] : sunscreen [Drugs/Alcohol] : drugs, alcohol [Breast Self Exam] : breast self exam [Bladder Hygiene] : bladder hygiene [Confidentiality] : confidentiality [Vaccines] : vaccines

## 2023-02-01 NOTE — HISTORY OF PRESENT ILLNESS
[Menarche Age: ____] : age at menarche was [unfilled] [Y] : Patient uses contraception [PGHxTotal] : 4 [[facility]] : 4 [PGHxPremature] : 0 [PGHxAbortions] : 0 [[facility]Living] : 4 [PGHxABInduced] : 0 [PGHxEctopic] : 0 [PGHxABSpont] : 0 [PGHxMultBirths] : 0

## 2023-02-01 NOTE — PHYSICAL EXAM
[Chaperone Present] : A chaperone was present in the examining room during all aspects of the physical examination [FreeTextEntry1] : Chata [Appropriately responsive] : appropriately responsive [Alert] : alert [No Acute Distress] : no acute distress [No Lymphadenopathy] : no lymphadenopathy [Regular Rate Rhythm] : regular rate rhythm [No Murmurs] : no murmurs [Clear to Auscultation B/L] : clear to auscultation bilaterally [Soft] : soft [Non-tender] : non-tender [Non-distended] : non-distended [No HSM] : No HSM [No Lesions] : no lesions [No Mass] : no mass [Oriented x3] : oriented x3 [Examination Of The Breasts] : a normal appearance [No Masses] : no breast masses were palpable [Labia Majora] : normal [Labia Minora] : normal [Normal] : normal [Uterine Adnexae] : normal

## 2023-02-04 LAB — CYTOLOGY CVX/VAG DOC THIN PREP: NORMAL

## 2023-03-06 ENCOUNTER — APPOINTMENT (OUTPATIENT)
Dept: OBGYN | Facility: CLINIC | Age: 48
End: 2023-03-06
Payer: MEDICAID

## 2023-03-06 VITALS
DIASTOLIC BLOOD PRESSURE: 82 MMHG | SYSTOLIC BLOOD PRESSURE: 132 MMHG | BODY MASS INDEX: 24.11 KG/M2 | HEIGHT: 66 IN | WEIGHT: 150 LBS

## 2023-03-06 DIAGNOSIS — N70.93 SALPINGITIS AND OOPHORITIS, UNSPECIFIED: ICD-10-CM

## 2023-03-06 PROCEDURE — 99213 OFFICE O/P EST LOW 20 MIN: CPT

## 2023-03-14 ENCOUNTER — OUTPATIENT (OUTPATIENT)
Dept: OUTPATIENT SERVICES | Facility: HOSPITAL | Age: 48
LOS: 1 days | End: 2023-03-14

## 2023-03-14 ENCOUNTER — APPOINTMENT (OUTPATIENT)
Dept: ULTRASOUND IMAGING | Facility: CLINIC | Age: 48
End: 2023-03-14
Payer: MEDICAID

## 2023-03-14 DIAGNOSIS — Z98.51 TUBAL LIGATION STATUS: Chronic | ICD-10-CM

## 2023-03-14 DIAGNOSIS — N70.93 SALPINGITIS AND OOPHORITIS, UNSPECIFIED: ICD-10-CM

## 2023-03-14 PROCEDURE — 76856 US EXAM PELVIC COMPLETE: CPT | Mod: 26

## 2023-04-18 ENCOUNTER — APPOINTMENT (OUTPATIENT)
Dept: OBGYN | Facility: CLINIC | Age: 48
End: 2023-04-18
Payer: MEDICAID

## 2023-04-18 VITALS
DIASTOLIC BLOOD PRESSURE: 90 MMHG | BODY MASS INDEX: 23.95 KG/M2 | WEIGHT: 149 LBS | SYSTOLIC BLOOD PRESSURE: 157 MMHG | HEIGHT: 66 IN

## 2023-04-18 DIAGNOSIS — D25.9 LEIOMYOMA OF UTERUS, UNSPECIFIED: ICD-10-CM

## 2023-04-18 PROCEDURE — 99214 OFFICE O/P EST MOD 30 MIN: CPT

## 2023-12-11 ENCOUNTER — APPOINTMENT (OUTPATIENT)
Dept: OBGYN | Facility: CLINIC | Age: 48
End: 2023-12-11
Payer: MEDICAID

## 2023-12-11 VITALS
DIASTOLIC BLOOD PRESSURE: 80 MMHG | SYSTOLIC BLOOD PRESSURE: 130 MMHG | WEIGHT: 149 LBS | HEIGHT: 66 IN | BODY MASS INDEX: 23.95 KG/M2

## 2023-12-11 DIAGNOSIS — R10.2 PELVIC AND PERINEAL PAIN: ICD-10-CM

## 2023-12-11 DIAGNOSIS — Z87.39 PERSONAL HISTORY OF OTHER DISEASES OF THE MUSCULOSKELETAL SYSTEM AND CONNECTIVE TISSUE: ICD-10-CM

## 2023-12-11 PROCEDURE — 99213 OFFICE O/P EST LOW 20 MIN: CPT

## 2023-12-11 RX ORDER — DIAZEPAM 5 MG/1
5 TABLET ORAL
Qty: 1 | Refills: 0 | Status: DISCONTINUED | COMMUNITY
Start: 2022-12-02 | End: 2023-12-11

## 2023-12-11 RX ORDER — MEDROXYPROGESTERONE ACETATE 10 MG/1
10 TABLET ORAL
Qty: 10 | Refills: 2 | Status: DISCONTINUED | COMMUNITY
Start: 2022-12-02 | End: 2023-12-11

## 2023-12-11 RX ORDER — MISOPROSTOL 200 UG/1
200 TABLET ORAL
Qty: 2 | Refills: 0 | Status: DISCONTINUED | COMMUNITY
Start: 2022-12-02 | End: 2023-12-11

## 2024-02-06 NOTE — PATIENT PROFILE ADULT - ARRIVAL FROM
What Type Of Note Output Would You Prefer (Optional)?: Standard Output
How Severe Are Your Spot(S)?: mild
Hpi Title: Evaluation of Skin Lesions
Additional History: Patient states she has a freckle on her right wrist that is now inflamed and raised.
Home

## 2024-08-27 DIAGNOSIS — Z01.419 ENCOUNTER FOR GYNECOLOGICAL EXAMINATION (GENERAL) (ROUTINE) W/OUT ABNORMAL FINDINGS: ICD-10-CM

## 2024-08-28 ENCOUNTER — NON-APPOINTMENT (OUTPATIENT)
Age: 49
End: 2024-08-28

## 2024-08-29 ENCOUNTER — APPOINTMENT (OUTPATIENT)
Dept: OBGYN | Facility: CLINIC | Age: 49
End: 2024-08-29
Payer: COMMERCIAL

## 2024-08-29 ENCOUNTER — RESULT REVIEW (OUTPATIENT)
Age: 49
End: 2024-08-29

## 2024-08-29 VITALS
BODY MASS INDEX: 23.95 KG/M2 | DIASTOLIC BLOOD PRESSURE: 76 MMHG | HEIGHT: 66 IN | SYSTOLIC BLOOD PRESSURE: 120 MMHG | WEIGHT: 149 LBS

## 2024-08-29 DIAGNOSIS — D25.9 LEIOMYOMA OF UTERUS, UNSPECIFIED: ICD-10-CM

## 2024-08-29 DIAGNOSIS — R10.2 PELVIC AND PERINEAL PAIN: ICD-10-CM

## 2024-08-29 DIAGNOSIS — N60.19 DIFFUSE CYSTIC MASTOPATHY OF UNSPECIFIED BREAST: ICD-10-CM

## 2024-08-29 PROCEDURE — 99459 PELVIC EXAMINATION: CPT

## 2024-08-29 PROCEDURE — 99396 PREV VISIT EST AGE 40-64: CPT

## 2024-09-04 ENCOUNTER — APPOINTMENT (OUTPATIENT)
Dept: ULTRASOUND IMAGING | Facility: CLINIC | Age: 49
End: 2024-09-04
Payer: COMMERCIAL

## 2024-09-04 ENCOUNTER — APPOINTMENT (OUTPATIENT)
Dept: MAMMOGRAPHY | Facility: CLINIC | Age: 49
End: 2024-09-04
Payer: COMMERCIAL

## 2024-09-04 ENCOUNTER — RESULT REVIEW (OUTPATIENT)
Age: 49
End: 2024-09-04

## 2024-09-04 PROCEDURE — 76641 ULTRASOUND BREAST COMPLETE: CPT | Mod: 50

## 2024-09-04 PROCEDURE — 77066 DX MAMMO INCL CAD BI: CPT

## 2024-09-04 PROCEDURE — 76830 TRANSVAGINAL US NON-OB: CPT

## 2024-09-04 PROCEDURE — G0279: CPT

## 2024-09-04 NOTE — PHYSICAL EXAM
[Chaperone Present] : A chaperone was present in the examining room during all aspects of the physical examination [[id number]] : A chaperone was present during the pelvic exam. [Appropriately responsive] : appropriately responsive [Alert] : alert [No Acute Distress] : no acute distress [No Lymphadenopathy] : no lymphadenopathy [Regular Rate Rhythm] : regular rate rhythm [No Murmurs] : no murmurs [Clear to Auscultation B/L] : clear to auscultation bilaterally [Soft] : soft [Non-tender] : non-tender [Non-distended] : non-distended [No HSM] : No HSM [No Lesions] : no lesions [No Mass] : no mass [Oriented x3] : oriented x3 [Examination Of The Breasts] : a normal appearance [No Masses] : no breast masses were palpable [Labia Majora] : normal [Labia Minora] : normal [Normal] : normal [Tenderness] : tender [Enlarged ___ wks] : enlarged [unfilled] ~Uweeks [Uterine Adnexae] : normal [Declined] : Patient declined rectal exam [FreeTextEntry2] : yamilet [FreeTextEntry6] : Cystic breasts bilaterally

## 2024-09-04 NOTE — DISCUSSION/SUMMARY
[FreeTextEntry1] : 49-year-old -0-1-4 with enlarging fibroid uterus.  She had bilateral tubal ligation.  Physical exam showed an enlarged 14 to 15 weeks size uterus.  Pap GC chlamydia sent.  Pelvic sonogram ordered.  Return in 6 weeks for endometrial sampling hysteroscopy to rule out any pathology.  Mammogram and breast sonogram ordered because of the cystic changes of the breast.  Yes lets make the mammogram diagnostic.  All questions answered. Safe sexual practices discussed. And exercise stressed.

## 2024-09-04 NOTE — HISTORY OF PRESENT ILLNESS
[Menarche Age: ____] : age at menarche was [unfilled] [Y] : Patient uses contraception [FreeTextEntry1] : 49-year-old -0-1-4 last menstrual cycle was 2024 for 4 days presents for GYN evaluation.  She is sexually active and had bilateral tubal ligation.  History of fibroid uterus but no recent pelvic pain. [PGHxTotal] : 5 [[facility]FullTerm] : 4 [PGHxPremature] : 0 [PGHxAbortions] : 1 [[facility]xLiving] : 4 [PGHxABInduced] : 0 [PGHxABSpont] : 1 [PGHxEctopic] : 0 [PGHxMultBirths] : 0

## 2024-09-04 NOTE — PHYSICAL EXAM
[Chaperone Present] : A chaperone was present in the examining room during all aspects of the physical examination [[id number]] : A chaperone was present during the pelvic exam. [Appropriately responsive] : appropriately responsive [Alert] : alert [No Acute Distress] : no acute distress [No Lymphadenopathy] : no lymphadenopathy [Regular Rate Rhythm] : regular rate rhythm [No Murmurs] : no murmurs [Clear to Auscultation B/L] : clear to auscultation bilaterally [Soft] : soft [Non-tender] : non-tender [Non-distended] : non-distended [No HSM] : No HSM [No Lesions] : no lesions [No Mass] : no mass [Oriented x3] : oriented x3 [Examination Of The Breasts] : a normal appearance [No Masses] : no breast masses were palpable [Labia Majora] : normal [Labia Minora] : normal [Normal] : normal [Tenderness] : tender [Enlarged ___ wks] : enlarged [unfilled] ~Uweeks [Uterine Adnexae] : normal [Declined] : Patient declined rectal exam [FreeTextEntry2] : yamilet [FreeTextEntry6] : Cystic breasts bilaterally

## 2024-09-04 NOTE — HISTORY OF PRESENT ILLNESS
[Menarche Age: ____] : age at menarche was [unfilled] [Y] : Patient uses contraception [FreeTextEntry1] : 49-year-old -0-1-4 last menstrual cycle was 2024 for 4 days presents for GYN evaluation.  She is sexually active and had bilateral tubal ligation.  History of fibroid uterus but no recent pelvic pain. [PGHxTotal] : 5 [[facility] : [facility] [PGHxPremature] : 0 [PGHxAbortions] : 1 [[facility] : 4 [PGHxABInduced] : 0 [PGHxABSpont] : 1 [PGHxEctopic] : 0 [PGHxMultBirths] : 0

## 2024-09-05 LAB — CYTOLOGY CVX/VAG DOC THIN PREP: ABNORMAL

## 2024-09-06 DIAGNOSIS — N64.89 OTHER SPECIFIED DISORDERS OF BREAST: ICD-10-CM

## 2024-09-06 DIAGNOSIS — Z91.89 OTHER SPECIFIED PERSONAL RISK FACTORS, NOT ELSEWHERE CLASSIFIED: ICD-10-CM

## 2024-09-10 ENCOUNTER — APPOINTMENT (OUTPATIENT)
Dept: MAMMOGRAPHY | Facility: CLINIC | Age: 49
End: 2024-09-10
Payer: COMMERCIAL

## 2024-09-10 ENCOUNTER — RESULT REVIEW (OUTPATIENT)
Age: 49
End: 2024-09-10

## 2024-09-10 ENCOUNTER — APPOINTMENT (OUTPATIENT)
Dept: ULTRASOUND IMAGING | Facility: CLINIC | Age: 49
End: 2024-09-10

## 2024-09-10 PROCEDURE — G0279: CPT

## 2024-09-10 PROCEDURE — 77066 DX MAMMO INCL CAD BI: CPT

## 2024-09-10 PROCEDURE — 76642 ULTRASOUND BREAST LIMITED: CPT | Mod: 50

## 2024-10-04 ENCOUNTER — APPOINTMENT (OUTPATIENT)
Dept: OBGYN | Facility: CLINIC | Age: 49
End: 2024-10-04
Payer: COMMERCIAL

## 2024-10-04 VITALS
BODY MASS INDEX: 23.95 KG/M2 | HEIGHT: 66 IN | DIASTOLIC BLOOD PRESSURE: 76 MMHG | WEIGHT: 149 LBS | SYSTOLIC BLOOD PRESSURE: 120 MMHG

## 2024-10-04 DIAGNOSIS — N92.1 EXCESSIVE AND FREQUENT MENSTRUATION WITH IRREGULAR CYCLE: ICD-10-CM

## 2024-10-04 PROCEDURE — 58558Z: CUSTOM

## 2024-10-04 NOTE — PROCEDURE
[Hysteroscopy] : Hysteroscopy [Time out performed] : Pre-procedure time out performed.  Patient's name, date of birth and procedure confirmed. [Consent Obtained] : Consent obtained [Abnormal uterine bleeding] : abnormal uterine bleeding [Risks] : risks [Benefits] : benefits [Alternatives] : alternatives [Patient] : patient [Infection] : infection [Bleeding] : bleeding [Allergic Reaction] : allergic reaction [Lidocaine___ mL] : [unfilled] ~UmL of lidocaine [flexible] : Using aseptic technique a hysteroscopy was performed using a flexible hysteroscope [Sent to Pathology] : specimen was placed in buffered formalin and sent for pathology [Hemostasis obtained] : hemostasis obtained [Tolerated Well] : Patient tolerated the procedure well [Aftercare instructions/regstrictions given and follow-up scheduled] : Aftercare instructions/restrictions given and follow-up scheduled [Antibiotics given] : antibiotics not given [de-identified] : Under sterile condition and with paracervical block the cervix was dilated and endometrial sampling obtained and sent to pathology.  Hysteroscopic evaluation shows irregular cavity with some lush features.

## 2024-10-09 LAB — CORE LAB BIOPSY: NORMAL

## 2024-11-04 ENCOUNTER — APPOINTMENT (OUTPATIENT)
Dept: OBGYN | Facility: CLINIC | Age: 49
End: 2024-11-04
Payer: COMMERCIAL

## 2024-11-04 VITALS
BODY MASS INDEX: 24.33 KG/M2 | SYSTOLIC BLOOD PRESSURE: 130 MMHG | WEIGHT: 151.4 LBS | DIASTOLIC BLOOD PRESSURE: 86 MMHG | HEIGHT: 66 IN

## 2024-11-04 DIAGNOSIS — D25.9 LEIOMYOMA OF UTERUS, UNSPECIFIED: ICD-10-CM

## 2024-11-04 DIAGNOSIS — N92.1 EXCESSIVE AND FREQUENT MENSTRUATION WITH IRREGULAR CYCLE: ICD-10-CM

## 2024-11-04 PROCEDURE — 99214 OFFICE O/P EST MOD 30 MIN: CPT

## 2025-03-06 ENCOUNTER — APPOINTMENT (OUTPATIENT)
Dept: MRI IMAGING | Facility: CLINIC | Age: 50
End: 2025-03-06
Payer: COMMERCIAL

## 2025-03-06 PROCEDURE — 77049 MRI BREAST C-+ W/CAD BI: CPT

## 2025-03-06 PROCEDURE — A9585: CPT

## 2025-06-05 ENCOUNTER — APPOINTMENT (OUTPATIENT)
Dept: OBGYN | Facility: CLINIC | Age: 50
End: 2025-06-05
Payer: COMMERCIAL

## 2025-06-05 VITALS
DIASTOLIC BLOOD PRESSURE: 66 MMHG | BODY MASS INDEX: 24.27 KG/M2 | WEIGHT: 151 LBS | SYSTOLIC BLOOD PRESSURE: 124 MMHG | HEIGHT: 66 IN

## 2025-06-05 DIAGNOSIS — N92.1 EXCESSIVE AND FREQUENT MENSTRUATION WITH IRREGULAR CYCLE: ICD-10-CM

## 2025-06-05 DIAGNOSIS — D25.9 LEIOMYOMA OF UTERUS, UNSPECIFIED: ICD-10-CM

## 2025-06-05 PROCEDURE — 99213 OFFICE O/P EST LOW 20 MIN: CPT

## 2025-06-05 PROCEDURE — 99459 PELVIC EXAMINATION: CPT

## 2025-06-05 PROCEDURE — 99214 OFFICE O/P EST MOD 30 MIN: CPT
